# Patient Record
Sex: MALE | Race: WHITE | NOT HISPANIC OR LATINO | Employment: FULL TIME | URBAN - NONMETROPOLITAN AREA
[De-identification: names, ages, dates, MRNs, and addresses within clinical notes are randomized per-mention and may not be internally consistent; named-entity substitution may affect disease eponyms.]

---

## 2017-02-08 ENCOUNTER — HISTORY (OUTPATIENT)
Dept: EMERGENCY MEDICINE | Facility: OTHER | Age: 42
End: 2017-02-08

## 2017-09-12 ENCOUNTER — HISTORY (OUTPATIENT)
Dept: FAMILY MEDICINE | Facility: OTHER | Age: 42
End: 2017-09-12

## 2017-09-12 ENCOUNTER — OFFICE VISIT - GICH (OUTPATIENT)
Dept: FAMILY MEDICINE | Facility: OTHER | Age: 42
End: 2017-09-12

## 2017-09-12 DIAGNOSIS — T15.92XA: ICD-10-CM

## 2017-09-12 DIAGNOSIS — S05.92XA UNSPECIFIED INJURY OF LEFT EYE AND ORBIT, INITIAL ENCOUNTER: ICD-10-CM

## 2017-10-30 ENCOUNTER — HISTORY (OUTPATIENT)
Dept: FAMILY MEDICINE | Facility: OTHER | Age: 42
End: 2017-10-30

## 2017-10-30 ENCOUNTER — OFFICE VISIT - GICH (OUTPATIENT)
Dept: FAMILY MEDICINE | Facility: OTHER | Age: 42
End: 2017-10-30

## 2017-10-30 DIAGNOSIS — K21.9 GASTRO-ESOPHAGEAL REFLUX DISEASE WITHOUT ESOPHAGITIS: ICD-10-CM

## 2017-12-28 NOTE — PATIENT INSTRUCTIONS
Patient Information     Patient Name MRN Ankush Vora [id number] Male 1975      Patient Instructions by Priscilla Boothe NP at 2017  1:00 PM     Author:  Priscilla Boothe NP Service:  (none) Author Type:  PHYS- Nurse Practitioner     Filed:  2017  1:53 PM Encounter Date:  2017 Status:  Signed     :  Priscilla Boothe NP (PHYS- Nurse Practitioner)            We made an eye doctor appointment    Updated the TDAP (tetanus) today.     Follow what the eye doctor tells you

## 2017-12-28 NOTE — PROGRESS NOTES
Patient Information     Patient Name MRN Sex Ankush Marshall [id number] Male 1975      Progress Notes by Riley Atkins MD at 10/30/2017 10:45 AM     Author:  Riley Atkins MD Service:  (none) Author Type:  Physician     Filed:  10/30/2017 11:00 AM Encounter Date:  10/30/2017 Status:  Signed     :  Riley Atkins MD (Physician)            SUBJECTIVE:  Ankush Gamez is a 42 y.o. male who presents for a refill on omeprazole. He has a history of GERD and reports this controls symptoms well. He is in inpatient treatment and has been out of this for a week. Has noted a burning sensation in the epigastrium radiating up into his chest.    No Known Allergies and   No current outpatient prescriptions on file prior to visit.     No current facility-administered medications on file prior to visit.        OBJECTIVE:  /66  Pulse 72  Wt 74 kg (163 lb 2 oz)  BMI 23.41 kg/m2  EXAM:  General Appearance: Pleasant, alert, appropriate appearance for age. No acute distress  Gastrointestinal Exam: Mild tenderness noted in the epigastrium and right upper quadrant. No masses or organomegaly.    ASSESSMENT/Plan :      ICD-10-CM    1. Gastroesophageal reflux disease, esophagitis presence not specified  Omeprazole refilled. Form completed for [facility].  K21.9 omeprazole (PRILOSEC) 40 mg Delayed-Release capsule       Riley Atkins MD

## 2017-12-28 NOTE — PROGRESS NOTES
Patient Information     Patient Name MRN Ankush Vora [id number] Male 1975      Progress Notes by Priscilla Boothe NP at 2017  1:00 PM     Author:  Priscilla Boothe NP Service:  (none) Author Type:  PHYS- Nurse Practitioner     Filed:  2017  2:36 PM Encounter Date:  2017 Status:  Signed     :  Priscilla Boothe NP (PHYS- Nurse Practitioner)            Nursing Notes:   Yue Farr  2017  1:21 PM  Signed  Patient presents to the clinic for irritated left eye. Patient reports welding on his trailer a few days ago and felt something get past his safety glasses. He says it steadily got worse over time.  Yue B, CMA.......2017..1:11 PM    Visual Acuity Screening   Visual acuity OD (right eye): 10/12.5   Visual acuity OS (left eye): 10/50   Visual acuity OU (both eyes): 10/12.5   Corrective lenses worn: No   Yue B, CMA.......2017..1:20 PM       SUBJECTIVE:    Ankush Gamez is a 42 y.o. male who presents for eye injury    Eye Injury    The left eye is affected. This is a new problem. Episode onset: 2 days ago. The problem has been unchanged. The injury mechanism was a foreign body. The pain is mild. There is no known exposure to pink eye. He does not wear contacts. Associated symptoms include eye redness. Pertinent negatives include no blurred vision, fever, itching, recent URI or vomiting. He has tried nothing for the symptoms.       Current Outpatient Prescriptions on File Prior to Visit       Medication  Sig Dispense Refill     OMEPRAZOLE 40 MG CAP, DELAYED RELEASE take 1 capsule (40 mg) by oral route once daily before a meal 30 5     No current facility-administered medications on file prior to visit.     and   Patient Active Problem List      Diagnosis Date Noted     Esophageal reflux 2008     Adjustment disorder with mixed anxiety and depressed mood 2008     Other, mixed, or unspecified nondependent drug abuse, in remission 2008  "      REVIEW OF SYSTEMS:  Review of Systems   Constitutional: Negative for fever.   Eyes: Positive for redness. Negative for blurred vision.   Gastrointestinal: Negative for vomiting.   Skin: Negative for itching.       OBJECTIVE:  /78  Pulse 72  Temp 97.4  F (36.3  C) (Tympanic)  Ht 1.778 m (5' 10\")  Wt 69.2 kg (152 lb 8 oz)  BMI 21.88 kg/m2    EXAM:   Physical Exam   Constitutional: He is well-developed, well-nourished, and in no distress.   HENT:   Head: Normocephalic and atraumatic.   Eyes: EOM are normal. Pupils are equal, round, and reactive to light. Right conjunctiva is not injected. Right conjunctiva has no hemorrhage. Left conjunctiva is injected.   Slit lamp exam:       The left eye shows fluorescein uptake.       Black FB noted center of cornea. Mild abrasions noted on stain exam.    Neck: Neck supple.   Cardiovascular: Normal rate.    Pulmonary/Chest: Effort normal. No respiratory distress.   Lymphadenopathy:     He has no cervical adenopathy.   Skin: Skin is warm and dry. No rash noted.   Psychiatric: Mood and affect normal.   Nursing note and vitals reviewed.      ASSESSMENT/PLAN:    ICD-10-CM    1. Left eye injury, initial encounter S05.92XA TDAP VACCINE IM      AMB CONSULT TO OPTOMETRY/OPHTHALMOLOGY   2. Foreign body, eye, left, initial encounter T15.92XA TDAP VACCINE IM      AMB CONSULT TO OPTOMETRY/OPHTHALMOLOGY        Plan:  Attempted to remove FB with blunt end of a qtip and with a needle, without success. TDAP given. F/U with eye doctor, follow their instructions.       MARYBETH SCHWARTZ NP ....................  9/12/2017   1:58 PM          "

## 2017-12-30 NOTE — NURSING NOTE
Patient Information     Patient Name MRAnkush Hidalgo [id number] Male 1975      Nursing Note by Yue Farr at 2017  1:00 PM     Author:  Yue Farr Service:  (none) Author Type:  (none)     Filed:  2017  1:21 PM Encounter Date:  2017 Status:  Signed     :  Yue Farr            Patient presents to the clinic for irritated left eye. Patient reports welding on his trailer a few days ago and felt something get past his safety glasses. He says it steadily got worse over time.  Yue B, CMA.......2017..1:11 PM    Visual Acuity Screening   Visual acuity OD (right eye): 10/12.5   Visual acuity OS (left eye): 10/50   Visual acuity OU (both eyes): 10/12.5   Corrective lenses worn: No   Yue B, CMA.......2017..1:20 PM

## 2017-12-30 NOTE — NURSING NOTE
Patient Information     Patient Name MRN Ankush Vora [id number] Male 1975      Nursing Note by Yue Farr at 2017  1:00 PM     Author:  Yue Farr Service:  (none) Author Type:  (none)     Filed:  2017  2:18 PM Encounter Date:  2017 Status:  Signed     :  Yue Farr            Fluorescein sodium strip ordered by Priscilla Boothe NP.  Medication administered per order in [place]   Lot # [id number]  Exp.   Patient tolerated well.  Yue Farr...2017..2:17 PM    Tetracaine hydrochloride ordered by Priscilla Boothe NP.  Medication administered per order in [place]   Lot # [id number]  Exp. 2019  Patient tolerated well.  Yue Farr...2017..2:17 PM

## 2017-12-30 NOTE — NURSING NOTE
Patient Information     Patient Name MRN Ankush Vora [id number] Male 1975      Nursing Note by Mei Hood at 10/30/2017 10:45 AM     Author:  Mei Hood Service:  (none) Author Type:  (none)     Filed:  10/30/2017 10:54 AM Encounter Date:  10/30/2017 Status:  Signed     :  Mei Hood            Patient presents today with chronic acid reflux. He states that he is in treatment and they require a prescription to be able to administer the medication to him. He takes omeprazole OTC and this works for him.  Mei Hood LPN  10/30/2017  10:47 AM

## 2018-01-09 ENCOUNTER — OFFICE VISIT - GICH (OUTPATIENT)
Dept: FAMILY MEDICINE | Facility: OTHER | Age: 43
End: 2018-01-09

## 2018-01-09 ENCOUNTER — HISTORY (OUTPATIENT)
Dept: FAMILY MEDICINE | Facility: OTHER | Age: 43
End: 2018-01-09

## 2018-01-09 DIAGNOSIS — A64 SEXUALLY TRANSMITTED DISEASE: ICD-10-CM

## 2018-01-09 DIAGNOSIS — A54.01: ICD-10-CM

## 2018-01-09 DIAGNOSIS — R30.0 DYSURIA: ICD-10-CM

## 2018-01-16 RX ORDER — OMEPRAZOLE 40 MG/1
40 CAPSULE, DELAYED RELEASE ORAL
Status: ON HOLD | COMMUNITY
Start: 2017-10-30 | End: 2022-01-25

## 2018-01-16 RX ORDER — AZITHROMYCIN 500 MG/1
TABLET, FILM COATED ORAL
COMMUNITY
Start: 2018-01-09 | End: 2019-12-14

## 2018-01-25 VITALS
WEIGHT: 163.13 LBS | DIASTOLIC BLOOD PRESSURE: 66 MMHG | BODY MASS INDEX: 23.41 KG/M2 | SYSTOLIC BLOOD PRESSURE: 130 MMHG | HEART RATE: 72 BPM

## 2018-01-25 VITALS
TEMPERATURE: 97.4 F | BODY MASS INDEX: 21.83 KG/M2 | SYSTOLIC BLOOD PRESSURE: 122 MMHG | WEIGHT: 152.5 LBS | HEART RATE: 72 BPM | DIASTOLIC BLOOD PRESSURE: 78 MMHG | HEIGHT: 70 IN

## 2018-02-09 VITALS
SYSTOLIC BLOOD PRESSURE: 132 MMHG | BODY MASS INDEX: 24.2 KG/M2 | WEIGHT: 169 LBS | HEIGHT: 70 IN | HEART RATE: 80 BPM | DIASTOLIC BLOOD PRESSURE: 80 MMHG

## 2018-02-12 NOTE — PROGRESS NOTES
"Patient Information     Patient Name MRN Sex Ankush Marshall [id number] Male 1975      Progress Notes by Mila Antoine MD at 2018  1:30 PM     Author:  Mila Antoine MD Service:  (none) Author Type:  Physician     Filed:  2018  3:50 PM Encounter Date:  2018 Status:  Signed     :  Mila Antoine MD (Physician)            SUBJECTIVE:    Ankush Gamez is a 42 y.o. male who presents for concerns about burning with urination and penile discharge in the past week. No known contacts or previous + STD testing. Reports a large amount of penile discharge.    HPI    No Known Allergies,   Current Outpatient Prescriptions:      azithromycin (ZITHROMAX) 500 mg tablet, Take 2 tablets(1000mg) for one time dose., Disp: 2 tablet, Rfl: 0     omeprazole (PRILOSEC) 40 mg Delayed-Release capsule, Take 1 capsule by mouth once daily., Disp: 30 capsule, Rfl: 5  Medications have been reviewed by me and are current to the best of my knowledge and ability. and   Patient Active Problem List      Diagnosis Date Noted     Esophageal reflux 2008     Adjustment disorder with mixed anxiety and depressed mood 2008     Other, mixed, or unspecified nondependent drug abuse, in remission 2008       REVIEW OF SYSTEMS:  ROS    OBJECTIVE:  /80 (Cuff Site: Right Arm, Position: Sitting, Cuff Size: Adult Regular)  Pulse 80  Ht 1.78 m (5' 10.08\")  Wt 76.7 kg (169 lb)  BMI 24.19 kg/m2    EXAM:   Physical Exam   Constitutional: He is well-developed, well-nourished, and in no distress. No distress.   Nursing note and vitals reviewed.    Results for orders placed or performed in visit on 18      GC CHLAMYDIA TRACH PROBE      Result  Value Ref Range    CHLAMYDIA PCR NOT Detected NOT Detected, Invalid    N GONORRHOEAE PCR Detected (A) NOT Detected, Invalid     I have personally reviewed the labs listed above.      ASSESSMENT/PLAN:    ICD-10-CM    1. Gonococcal urethritis in male A54.01  "   2. Dysuria R30.0 GC CHLAMYDIA TRACH PROBE      GC CHLAMYDIA TRACH PROBE   3. STI (sexually transmitted infection) A64 cefTRIAXone 250 mg intramuscular injection with lidocaine (ROCEPHIN)      azithromycin (ZITHROMAX) 500 mg tablet        Plan:    He should has been adequately treated for gonococcal disease with Rocephin given today. He will be notified of results by phone and was not available at all with first call.  Mila Antoine MD  3:49 PM 1/9/2018   Portions of this dictation were created using the Dragon Nuance voice recognition system. Proofreading was completed but there may be errors in text.

## 2018-02-12 NOTE — NURSING NOTE
Patient Information     Patient Name N Ankush Vora [id number] Male 1975      Nursing Note by Isa Donald at 2018  1:30 PM     Author:  Isa Donald Service:  (none) Author Type:  (none)     Filed:  2018  1:57 PM Encounter Date:  2018 Status:  Signed     :  Isa Donald            Ankush Gamez is a 42 y.o. Male here with c/o burning with urination, and discharge from penis, wants to be checked for STD.  Shanae Donald LPN ...... 2018 1:29 PM

## 2018-02-12 NOTE — PATIENT INSTRUCTIONS
Patient Information     Patient Name Ankush Barkley [id number] Male 1975      Patient Instructions by Mila Antoine MD at 2018  3:47 PM     Author:  Mila Antoine MD  Service:  (none) Author Type:  Physician     Filed:  2018  3:48 PM  Encounter Date:  2018 Status:  Addendum     :  Mila Antoine MD (Physician)        Related Notes: Original Note by Mila Antoine MD (Physician) filed at 2018  3:47 PM               Index Arabic Related topics   Gonorrhea in Men   ________________________________________________________________________  KEY POINTS    Gonorrhea is a sexually transmitted disease or infection that usually affects the urethra and other organs. Infection with gonorrhea can cause infertility, which means you may have trouble getting your partner pregnant when you decide you want to have a family.    You will be treated with antibiotic medicine. Your infection will be reported to the local health department confidentially, so your sexual partners can be told that they have had contact with someone who has a sexually transmitted infection.    To prevent a gonorrhea infection and other STDs, always use latex or polyurethane condoms during foreplay and every time you have vaginal, oral, or anal sex.  ________________________________________________________________________  What is gonorrhea?   Gonorrhea is a common sexually transmitted disease or infection (also called an STD or STI). Other names for gonorrhea are clap, drip, dose, and strain.  The infection usually starts in the urethra. The urethra is the tube that carries urine and semen out of the penis. The bacteria may also infect the throat or rectum during oral or anal sex. Gonorrhea that is not treated may spread into the bloodstream and to other parts of the body.     It may infect the joints and cause pain and swelling (arthritis).    It may spread to the brain and cause meningitis.    It  may infect the heart.    Rarely, it might cause death.  Also, if you have gonorrhea and have unsafe sex with someone who has HIV, you are more likely to be infected with HIV.  What is the cause?  The infection is caused by bacteria called Neisseria gonorrhoeae. It is usually passed from person to person during oral, vaginal, or anal sex.  What are the symptoms?   Many people don t have symptoms. This means you could pass the infection to your sexual partner without knowing that you are infected.   If you do have symptoms, they usually start 2 to 10 days after you were exposed to the disease. Symptoms may include:    Thick, yellow discharge from the penis    Burning or pain when you urinate    Feeling like you need to urinate often  How is it diagnosed?  Your healthcare provider will ask about your symptoms and sexual and medical history and examine you. Tests may include:     A test of fluid from the opening of the urethra, mouth, or anus    A urine test  How is it treated?  Gonorrhea is treated with antibiotic medicine. You may need to take more than 1 antibiotic.   If only the urethra is infected, antibiotic treatment should clear up the infection in about 10 days. If it is not treated, gonorrhea can cause scarring of the urethra, trouble urinating normally, and infection of the testicles. Testicle infection can sometimes cause infertility, which means that you would not be able to get your partner pregnant when you decide that you want to have a family.  You will be asked about your sexual partners. Your infection will be reported to the local health department and your sexual partners will be told that they have had contact with someone who has a sexually transmitted infection. (Your name will not be given.) This will help them get prompt treatment for the infection. It can also help prevent spreading the infection to others.  How can I take care of myself?    Take your medicine for as long as your healthcare  provider prescribes, even if you feel better. If you stop taking an antibiotic too soon, you may not kill all of the bacteria and you may get sick again.    Take nonprescription pain medicine if you need it.    Tell everyone with whom you have had sex in the last 3 months about your infection. Or you can ask the clinic staff to tell them without using your name. Your sexual contacts need to be treated even if they don t have any symptoms.    Don t have sex until both you and your partner have finished all of the medicine and your provider says it's OK. Then always use condoms every time you have sex.    Ask your healthcare provider:    How and when you will get your test results    If you should be tested for other STDs    How long it will take to recover from this illness    If there are activities you should avoid and when you can return to normal activities    When it is safe to have sex again    How to take care of yourself at home    What symptoms or problems you should watch for and what to do if you have them    If you need to come back to be checked to make sure the treatment worked  Keep all appointments for provider visits or tests.  How can I help prevent gonorrhea?  Gonorrhea can be a serious health threat to you and the people you have sex with. Many antibiotic medicines no longer treat gonorrhea, so prevention is very important.    Use latex or polyurethane condoms during foreplay and every time you have vaginal, oral, or anal sex.    Have just 1 sexual partner who is not sexually active with anyone else.    If you had sex and are worried that you may be infected, see your healthcare provider even if you don t have symptoms.  Developed by [employer].  Adult Advisor 2017.2 published by [employer].  Last modified: 2016-12-20  Last reviewed: 2016-01-21  This content is reviewed periodically and is subject to change as new health information becomes available. The information is intended to inform and  educate and is not a replacement for medical evaluation, advice, diagnosis or treatment by a healthcare professional.  References   Adult Advisor 2017.2 Index    Copyright   2017 [employer], a division of [employer] All rights reserved.

## 2019-12-14 ENCOUNTER — HOSPITAL ENCOUNTER (EMERGENCY)
Facility: OTHER | Age: 44
Discharge: HOME | End: 2019-12-14
Attending: EMERGENCY MEDICINE | Admitting: EMERGENCY MEDICINE

## 2019-12-14 VITALS
BODY MASS INDEX: 22.4 KG/M2 | RESPIRATION RATE: 22 BRPM | SYSTOLIC BLOOD PRESSURE: 142 MMHG | HEIGHT: 71 IN | TEMPERATURE: 98.6 F | DIASTOLIC BLOOD PRESSURE: 75 MMHG | OXYGEN SATURATION: 99 % | WEIGHT: 160 LBS

## 2019-12-14 DIAGNOSIS — R20.2 PARESTHESIAS: ICD-10-CM

## 2019-12-14 LAB
ALBUMIN SERPL-MCNC: 4.1 G/DL (ref 3.5–5.7)
ALP SERPL-CCNC: 65 U/L (ref 34–104)
ALT SERPL W P-5'-P-CCNC: 18 U/L (ref 7–52)
ANION GAP SERPL CALCULATED.3IONS-SCNC: 5 MMOL/L (ref 3–14)
AST SERPL W P-5'-P-CCNC: 19 U/L (ref 13–39)
BASOPHILS # BLD AUTO: 0.1 10E9/L (ref 0–0.2)
BASOPHILS NFR BLD AUTO: 0.4 %
BILIRUB SERPL-MCNC: 0.5 MG/DL (ref 0.3–1)
BUN SERPL-MCNC: 19 MG/DL (ref 7–25)
CALCIUM SERPL-MCNC: 9.2 MG/DL (ref 8.6–10.3)
CHLORIDE SERPL-SCNC: 102 MMOL/L (ref 98–107)
CO2 SERPL-SCNC: 29 MMOL/L (ref 21–31)
CREAT SERPL-MCNC: 0.84 MG/DL (ref 0.7–1.3)
DIFFERENTIAL METHOD BLD: ABNORMAL
EOSINOPHIL # BLD AUTO: 0.1 10E9/L (ref 0–0.7)
EOSINOPHIL NFR BLD AUTO: 1 %
ERYTHROCYTE [DISTWIDTH] IN BLOOD BY AUTOMATED COUNT: 12.6 % (ref 10–15)
GFR SERPL CREATININE-BSD FRML MDRD: >90 ML/MIN/{1.73_M2}
GLUCOSE SERPL-MCNC: 119 MG/DL (ref 70–105)
HCT VFR BLD AUTO: 41.3 % (ref 40–53)
HGB BLD-MCNC: 13.9 G/DL (ref 13.3–17.7)
IMM GRANULOCYTES # BLD: 0 10E9/L (ref 0–0.4)
IMM GRANULOCYTES NFR BLD: 0.2 %
LYMPHOCYTES # BLD AUTO: 2.4 10E9/L (ref 0.8–5.3)
LYMPHOCYTES NFR BLD AUTO: 19.6 %
MCH RBC QN AUTO: 31.4 PG (ref 26.5–33)
MCHC RBC AUTO-ENTMCNC: 33.7 G/DL (ref 31.5–36.5)
MCV RBC AUTO: 93 FL (ref 78–100)
MONOCYTES # BLD AUTO: 0.9 10E9/L (ref 0–1.3)
MONOCYTES NFR BLD AUTO: 7.4 %
NEUTROPHILS # BLD AUTO: 8.7 10E9/L (ref 1.6–8.3)
NEUTROPHILS NFR BLD AUTO: 71.4 %
PLATELET # BLD AUTO: 314 10E9/L (ref 150–450)
POTASSIUM SERPL-SCNC: 4.3 MMOL/L (ref 3.5–5.1)
PROT SERPL-MCNC: 6.6 G/DL (ref 6.4–8.9)
RBC # BLD AUTO: 4.43 10E12/L (ref 4.4–5.9)
SODIUM SERPL-SCNC: 136 MMOL/L (ref 134–144)
TSH SERPL DL<=0.05 MIU/L-ACNC: 5.93 IU/ML (ref 0.34–5.6)
WBC # BLD AUTO: 12.2 10E9/L (ref 4–11)

## 2019-12-14 PROCEDURE — 99283 EMERGENCY DEPT VISIT LOW MDM: CPT | Performed by: EMERGENCY MEDICINE

## 2019-12-14 PROCEDURE — 36415 COLL VENOUS BLD VENIPUNCTURE: CPT | Performed by: EMERGENCY MEDICINE

## 2019-12-14 PROCEDURE — 84443 ASSAY THYROID STIM HORMONE: CPT | Performed by: EMERGENCY MEDICINE

## 2019-12-14 PROCEDURE — 99283 EMERGENCY DEPT VISIT LOW MDM: CPT | Mod: Z6 | Performed by: EMERGENCY MEDICINE

## 2019-12-14 PROCEDURE — 80053 COMPREHEN METABOLIC PANEL: CPT | Performed by: EMERGENCY MEDICINE

## 2019-12-14 PROCEDURE — 85025 COMPLETE CBC W/AUTO DIFF WBC: CPT | Performed by: EMERGENCY MEDICINE

## 2019-12-14 ASSESSMENT — MIFFLIN-ST. JEOR: SCORE: 1642.89

## 2019-12-14 NOTE — ED AVS SNAPSHOT
[facility]  [address]  [place] [address]  Phone:  [phone]  Fax:  [phone]                                    Ankush Gamez   MRN: [id number]    Department:  [facility]   Date of Visit:  12/14/2019           After Visit Summary Signature Page    I have received my discharge instructions, and my questions have been answered. I have discussed any challenges I see with this plan with the nurse or doctor.    ..........................................................................................................................................  Patient/Patient Representative Signature      ..........................................................................................................................................  Patient Representative Print Name and Relationship to Patient    ..................................................               ................................................  Date                                   Time    ..........................................................................................................................................  Reviewed by Signature/Title    ...................................................              ..............................................  Date                                               Time          22EPIC Rev 08/18

## 2019-12-14 NOTE — ED TRIAGE NOTES
"Pt presents to the ER with numbness in both of his hands.  Started two days ago, he states he has had this before but was able to \"shake it off\" but this time he can't.   "

## 2019-12-14 NOTE — ED PROVIDER NOTES
"[facility]  Emergency Department Visit Note    Numbness      History of Present Illness     HPI:  Ankush Gamez is a 44 year old male presenting with numbness and tingling in his fingers, specifically the index, middle, and ring fingers of both hands. These symptoms are a chronic, intermittent problem but constant for the past 2 weeks prior to arrival. The patient has had similar symptoms in the past and has felt that he could always \"shake off\" the numbness. At time of emergency department evaluation, he has no  weakness, ataxia, headache, difficulty with word finding or formation, nausea, vomiting. Throughout this recent event, there have been no fever, chills, chest pain, abdominal pain, shortness of breath. There is no recent history of seizure activity or confusion.    Medications:  Prior to Admission medications    Medication Sig Last Dose Taking? Auth Provider   omeprazole (PRILOSEC) 40 MG capsule Take 40 mg by mouth 12/14/2019 at 0800 Yes Reported, Patient       Allergies:  No Known Allergies    Problem List:  Patient Active Problem List   Diagnosis     Adjustment disorder with mixed anxiety and depressed mood     Esophageal reflux     Other, mixed, or unspecified nondependent drug abuse, in remission       Past Medical History:  History reviewed. No pertinent past medical history.    Past Surgical History:  History reviewed. No pertinent surgical history.    Social History:  Social History     Tobacco Use     Smoking status: Current Every Day Smoker     Packs/day: 0.50     Types: Cigarettes     Smokeless tobacco: Never Used   Substance Use Topics     Alcohol use: No     Alcohol/week: 0.0 standard drinks     Drug use: Yes     Types: Methamphetamines, Other     Comment: Last 20 years       Review of Systems:  10 point review of systems obtained and pertinent positive and negative findings noted in HPI. Review of systems otherwise negative.      Physical Exam     Vital signs: BP (!) " "142/75   Temp 98.6  F (37  C) (Tympanic)   Resp 22   Ht 1.803 m (5' 11\")   Wt 72.6 kg (160 lb)   SpO2 99%   BMI 22.32 kg/m      Physical Exam:    General: awake and alert, comfortable  HEENT: atraumatic, no scleral injection, no nasal discharge, neck supple  Extremities: no deformities, edema, or tenderness  Skin: warm, dry, no rashes  Neuro: alert and oriented x 3, 5/5 bilateral hand , bicep, tricep, shoulder abduction, hip flexion, knee extension, plantar/dorsiflexion, sensation intact and symmetric to light touch in bilateral hands and feet, ambulates without difficulty or ataxia, finger to nose normal bilaterally, CN II-XII intact      Medical Decision Making & ED Course     Differential includes carpal tunnel syndrome, ulnar neuritis, cervical nerve root impingement, metabolic derangement, hypoglycemia, atypical migraine, psychosomatic. Neurologic exam is normal at time of emergency department evaluation and history is most consistent with carpal tunnel syndrome especially in light of his job as a  doing repetitive motions. CBC, CMP, TSH checked and follow up arranged with PCP.   .    Diagnosis & Disposition     Diagnosis:  1. Paresthesias        Disposition:  Home    MD Audrey Escobedo Theresa M, MD  12/14/19 1615    "

## 2019-12-20 ENCOUNTER — OFFICE VISIT (OUTPATIENT)
Dept: FAMILY MEDICINE | Facility: OTHER | Age: 44
End: 2019-12-20
Attending: FAMILY MEDICINE

## 2019-12-20 VITALS
HEIGHT: 71 IN | DIASTOLIC BLOOD PRESSURE: 80 MMHG | SYSTOLIC BLOOD PRESSURE: 134 MMHG | HEART RATE: 80 BPM | BODY MASS INDEX: 22 KG/M2 | RESPIRATION RATE: 17 BRPM | TEMPERATURE: 97.1 F | OXYGEN SATURATION: 98 % | WEIGHT: 157.13 LBS

## 2019-12-20 DIAGNOSIS — G56.03 BILATERAL CARPAL TUNNEL SYNDROME: Primary | ICD-10-CM

## 2019-12-20 PROCEDURE — 99213 OFFICE O/P EST LOW 20 MIN: CPT | Performed by: FAMILY MEDICINE

## 2019-12-20 ASSESSMENT — MIFFLIN-ST. JEOR: SCORE: 1629.84

## 2019-12-20 ASSESSMENT — PAIN SCALES - GENERAL: PAINLEVEL: NO PAIN (0)

## 2019-12-20 NOTE — NURSING NOTE
Patient presents to clinic today for numbness in right hand fingers. He states it used to go away, but is constant now.     No LMP for male patient.  Medication Reconciliation: complete    Carmencita Chen LPN  12/20/2019 11:17 AM

## 2019-12-23 NOTE — PROGRESS NOTES
"  SUBJECTIVE:   Ankush Gamez is a 44 year old male who presents to clinic today for the following health issues: ER follow-up patient was seen in the ER for paresthesia in the same fingers.  Is been going on for some time    .  Was seen in the ER 1 week ago.  Involving the middle fingers.  Right is worse than the left.        Patient Active Problem List    Diagnosis Date Noted     Bilateral carpal tunnel syndrome 12/20/2019     Priority: Medium     Adjustment disorder with mixed anxiety and depressed mood 02/24/2008     Priority: Medium     Esophageal reflux 02/24/2008     Priority: Medium     Other, mixed, or unspecified nondependent drug abuse, in remission 02/24/2008     Priority: Medium     History reviewed. No pertinent past medical history.   History reviewed. No pertinent surgical history.  No Known Allergies    Review of Systems     OBJECTIVE:     /80 (BP Location: Right arm, Patient Position: Sitting, Cuff Size: Adult Regular)   Pulse 80   Temp 97.1  F (36.2  C) (Tympanic)   Resp 17   Ht 1.803 m (5' 11\")   Wt 71.3 kg (157 lb 2 oz)   SpO2 98%   BMI 21.91 kg/m    Body mass index is 21.91 kg/m .  Physical Exam  Skin:     General: Skin is warm.   Neurological:      Mental Status: He is alert.      Deep Tendon Reflexes: Reflexes normal.      Comments: Positive Finkelstein sign on the right.  Hand grasp is decreased bilateral         Diagnostic Test Results:  none     ASSESSMENT/PLAN:         1. Bilateral carpal tunnel syndrome  Likely carpal tunnel.  Proceed with EMG.  - EMG; Future      Ho Tijerina MD  [facility]  "

## 2021-12-31 ENCOUNTER — HOSPITAL ENCOUNTER (EMERGENCY)
Facility: OTHER | Age: 46
Discharge: HOME | End: 2021-12-31
Attending: FAMILY MEDICINE | Admitting: FAMILY MEDICINE

## 2021-12-31 ENCOUNTER — APPOINTMENT (OUTPATIENT)
Dept: CT IMAGING | Facility: OTHER | Age: 46
End: 2021-12-31
Attending: FAMILY MEDICINE

## 2021-12-31 VITALS
RESPIRATION RATE: 21 BRPM | HEART RATE: 71 BPM | TEMPERATURE: 96.8 F | BODY MASS INDEX: 22.45 KG/M2 | OXYGEN SATURATION: 96 % | WEIGHT: 160.94 LBS | SYSTOLIC BLOOD PRESSURE: 118 MMHG | DIASTOLIC BLOOD PRESSURE: 59 MMHG

## 2021-12-31 DIAGNOSIS — R20.2 PARESTHESIAS: ICD-10-CM

## 2021-12-31 LAB
AMPHETAMINES UR QL: NOT DETECTED
ANION GAP SERPL CALCULATED.3IONS-SCNC: 6 MMOL/L (ref 3–14)
ATRIAL RATE - MUSE: 69 BPM
BARBITURATES UR QL SCN: NOT DETECTED
BASOPHILS # BLD AUTO: 0.1 10E3/UL (ref 0–0.2)
BASOPHILS NFR BLD AUTO: 0 %
BENZODIAZ UR QL SCN: NOT DETECTED
BUN SERPL-MCNC: 25 MG/DL (ref 7–25)
BUPRENORPHINE UR QL: NOT DETECTED
CALCIUM SERPL-MCNC: 9.2 MG/DL (ref 8.6–10.3)
CANNABINOIDS UR QL: ABNORMAL
CHLORIDE BLD-SCNC: 106 MMOL/L (ref 98–107)
CO2 SERPL-SCNC: 27 MMOL/L (ref 21–31)
COCAINE UR QL SCN: NOT DETECTED
CREAT SERPL-MCNC: 0.92 MG/DL (ref 0.7–1.3)
D-METHAMPHET UR QL: NOT DETECTED
DIASTOLIC BLOOD PRESSURE - MUSE: NORMAL MMHG
EOSINOPHIL # BLD AUTO: 0.2 10E3/UL (ref 0–0.7)
EOSINOPHIL NFR BLD AUTO: 2 %
ERYTHROCYTE [DISTWIDTH] IN BLOOD BY AUTOMATED COUNT: 13.2 % (ref 10–15)
GFR SERPL CREATININE-BSD FRML MDRD: >90 ML/MIN/1.73M2
GLUCOSE BLD-MCNC: 107 MG/DL (ref 70–105)
HCT VFR BLD AUTO: 38.2 % (ref 40–53)
HGB BLD-MCNC: 13.2 G/DL (ref 13.3–17.7)
IMM GRANULOCYTES # BLD: 0.1 10E3/UL
IMM GRANULOCYTES NFR BLD: 0 %
INTERPRETATION ECG - MUSE: NORMAL
LYMPHOCYTES # BLD AUTO: 2.1 10E3/UL (ref 0.8–5.3)
LYMPHOCYTES NFR BLD AUTO: 18 %
MAGNESIUM SERPL-MCNC: 2 MG/DL (ref 1.9–2.7)
MCH RBC QN AUTO: 32 PG (ref 26.5–33)
MCHC RBC AUTO-ENTMCNC: 34.6 G/DL (ref 31.5–36.5)
MCV RBC AUTO: 93 FL (ref 78–100)
METHADONE UR QL SCN: NOT DETECTED
MONOCYTES # BLD AUTO: 1.1 10E3/UL (ref 0–1.3)
MONOCYTES NFR BLD AUTO: 9 %
NEUTROPHILS # BLD AUTO: 8.1 10E3/UL (ref 1.6–8.3)
NEUTROPHILS NFR BLD AUTO: 71 %
NRBC # BLD AUTO: 0 10E3/UL
NRBC BLD AUTO-RTO: 0 /100
OPIATES UR QL SCN: NOT DETECTED
OXYCODONE UR QL SCN: NOT DETECTED
P AXIS - MUSE: 78 DEGREES
PCP UR QL SCN: NOT DETECTED
PLATELET # BLD AUTO: 321 10E3/UL (ref 150–450)
POTASSIUM BLD-SCNC: 3.8 MMOL/L (ref 3.5–5.1)
PR INTERVAL - MUSE: 120 MS
PROPOXYPH UR QL: NOT DETECTED
QRS DURATION - MUSE: 84 MS
QT - MUSE: 394 MS
QTC - MUSE: 422 MS
R AXIS - MUSE: 79 DEGREES
RBC # BLD AUTO: 4.13 10E6/UL (ref 4.4–5.9)
SODIUM SERPL-SCNC: 139 MMOL/L (ref 134–144)
SYSTOLIC BLOOD PRESSURE - MUSE: NORMAL MMHG
T AXIS - MUSE: 52 DEGREES
TRICYCLICS UR QL SCN: NOT DETECTED
TROPONIN I SERPL-MCNC: <2.4 PG/ML (ref 0–34)
VENTRICULAR RATE- MUSE: 69 BPM
WBC # BLD AUTO: 11.5 10E3/UL (ref 4–11)

## 2021-12-31 PROCEDURE — 80306 DRUG TEST PRSMV INSTRMNT: CPT | Performed by: FAMILY MEDICINE

## 2021-12-31 PROCEDURE — 85025 COMPLETE CBC W/AUTO DIFF WBC: CPT | Performed by: FAMILY MEDICINE

## 2021-12-31 PROCEDURE — 84484 ASSAY OF TROPONIN QUANT: CPT | Performed by: FAMILY MEDICINE

## 2021-12-31 PROCEDURE — 93005 ELECTROCARDIOGRAM TRACING: CPT | Performed by: FAMILY MEDICINE

## 2021-12-31 PROCEDURE — 80048 BASIC METABOLIC PNL TOTAL CA: CPT | Performed by: FAMILY MEDICINE

## 2021-12-31 PROCEDURE — 70450 CT HEAD/BRAIN W/O DYE: CPT | Mod: TC

## 2021-12-31 PROCEDURE — 36415 COLL VENOUS BLD VENIPUNCTURE: CPT | Performed by: FAMILY MEDICINE

## 2021-12-31 PROCEDURE — 93010 ELECTROCARDIOGRAM REPORT: CPT | Performed by: INTERNAL MEDICINE

## 2021-12-31 PROCEDURE — 99284 EMERGENCY DEPT VISIT MOD MDM: CPT | Performed by: FAMILY MEDICINE

## 2021-12-31 PROCEDURE — 83735 ASSAY OF MAGNESIUM: CPT | Performed by: FAMILY MEDICINE

## 2021-12-31 PROCEDURE — 99285 EMERGENCY DEPT VISIT HI MDM: CPT | Mod: 25 | Performed by: FAMILY MEDICINE

## 2021-12-31 ASSESSMENT — ENCOUNTER SYMPTOMS
DIFFICULTY URINATING: 0
NUMBNESS: 1
PALPITATIONS: 0
CHILLS: 0
SHORTNESS OF BREATH: 0
FEVER: 0
DIARRHEA: 0
FATIGUE: 0
DYSURIA: 0
COUGH: 0
HEADACHES: 0
LIGHT-HEADEDNESS: 0
SPEECH DIFFICULTY: 0
NAUSEA: 0
DIZZINESS: 0
WEAKNESS: 0
SORE THROAT: 0
ABDOMINAL PAIN: 0
FACIAL ASYMMETRY: 0
BACK PAIN: 0
VOMITING: 0
NECK PAIN: 0

## 2021-12-31 NOTE — ED TRIAGE NOTES
ED Nursing Triage Note (General)   ________________________________    Ankush Gamez is a 46 year old Male that presents to triage private car with history of sudden onset of numbness and tingling over entire body except for hands and feet along with a metallic taste in mouth reported by patient   Significant symptoms had onset 1.5 hour(s) ago. 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   /73   Pulse 80   Temp 96.8  F (36  C) (Tympanic)   Resp 16   Wt 73 kg (160 lb 15 oz)   SpO2 99%   BMI 22.45 kg/m  t  Patient appears alert , in no acute distress., and cooperative behavior.  GCS Eye Opening = 4=Spontaneous  Airway: intact  Breathing noted as Normal  Circulation Normal  Skin:  Normal  Action taken:  [place] [facility]      PRE HOSPITAL PRIOR LIVING SITUATION Significant Other

## 2021-12-31 NOTE — ED PROVIDER NOTES
History     Chief Complaint   Patient presents with     Numbness     HPI  Ankush Gamez is a 46 year old male who presents to the emergency room complaining of generalized numbness and tingling.  Patient states that he woke up it 2 AM this morning, approximately 2 hours ago, and has numbness and tingling all over his entire body.  He denies any headache, facial droop, difficulty speaking or finding words, weakness, fever, sore throat, cough, chest pain, shortness of breath.  He has no further questions or complaints today.  He denies any previous episodes.    Allergies:  No Known Allergies    Problem List:    Patient Active Problem List    Diagnosis Date Noted     Bilateral carpal tunnel syndrome 12/20/2019     Priority: Medium     Adjustment disorder with mixed anxiety and depressed mood 02/24/2008     Priority: Medium     Esophageal reflux 02/24/2008     Priority: Medium     Other, mixed, or unspecified nondependent drug abuse, in remission 02/24/2008     Priority: Medium        Past Medical History:    No past medical history on file.    Past Surgical History:    No past surgical history on file.    Family History:    No family history on file.    Social History:  Marital Status:  Single [1]  Social History     Tobacco Use     Smoking status: Current Every Day Smoker     Packs/day: 0.50     Types: Cigarettes     Smokeless tobacco: Never Used   Substance Use Topics     Alcohol use: No     Alcohol/week: 0.0 standard drinks     Drug use: Yes     Types: Methamphetamines, Other     Comment: Last 20 years        Medications:    omeprazole (PRILOSEC) 40 MG capsule          Review of Systems   Constitutional: Negative for chills, fatigue and fever.   HENT: Negative for congestion and sore throat.    Eyes: Negative for visual disturbance.   Respiratory: Negative for cough and shortness of breath.    Cardiovascular: Negative for chest pain, palpitations and leg swelling.   Gastrointestinal: Negative for abdominal pain,  diarrhea, nausea and vomiting.   Genitourinary: Negative for difficulty urinating and dysuria.   Musculoskeletal: Negative for back pain and neck pain.   Skin: Negative for rash.   Neurological: Positive for numbness. Negative for dizziness, facial asymmetry, speech difficulty, weakness, light-headedness and headaches.   All other systems reviewed and are negative.      Physical Exam   BP: 132/73  Pulse: 80  Temp: 96.8  F (36  C)  Resp: 16  Weight: 73 kg (160 lb 15 oz)  SpO2: 99 %      Physical Exam  Vitals and nursing note reviewed.   Constitutional:       General: He is not in acute distress.     Appearance: Normal appearance. He is well-developed, well-groomed and normal weight. He is not ill-appearing or diaphoretic.   HENT:      Head: Normocephalic and atraumatic.      Right Ear: External ear normal.      Left Ear: External ear normal.      Nose: Nose normal.      Mouth/Throat:      Lips: Pink.      Mouth: Mucous membranes are moist.      Pharynx: Oropharynx is clear. Uvula midline.   Eyes:      Extraocular Movements: Extraocular movements intact.      Conjunctiva/sclera: Conjunctivae normal.      Pupils: Pupils are equal, round, and reactive to light.   Neck:      Trachea: Trachea and phonation normal.   Cardiovascular:      Rate and Rhythm: Normal rate and regular rhythm.      Pulses: Normal pulses.      Heart sounds: Normal heart sounds. No murmur heard.      Pulmonary:      Effort: Pulmonary effort is normal.      Breath sounds: Normal breath sounds. No decreased breath sounds, wheezing, rhonchi or rales.   Abdominal:      General: Bowel sounds are normal.      Palpations: Abdomen is soft.      Tenderness: There is no abdominal tenderness.   Musculoskeletal:         General: No tenderness. Normal range of motion.      Cervical back: Full passive range of motion without pain, normal range of motion and neck supple.      Right lower leg: No edema.      Left lower leg: No edema.   Lymphadenopathy:       Cervical: No cervical adenopathy.   Skin:     General: Skin is warm and dry.      Capillary Refill: Capillary refill takes less than 2 seconds.      Findings: No rash.   Neurological:      Mental Status: He is alert and oriented to person, place, and time.      GCS: GCS eye subscore is 4. GCS verbal subscore is 5. GCS motor subscore is 6.      Cranial Nerves: Cranial nerves are intact.      Sensory: Sensation is intact.      Motor: Motor function is intact.   Psychiatric:         Behavior: Behavior is cooperative.         ED Course     Procedures           EKG Interpretation:      Interpreted by Jerzy Irizarry MD, MD  Time reviewed: 03:51  Symptoms at time of EKG: numbness   Rhythm: normal sinus   Rate: normal  Axis: normal  Ectopy: none  Conduction: normal  ST Segments/ T Waves: No ST-T wave changes  Q Waves: none  Comparison to prior: Unchanged    Clinical Impression: normal EKG    Critical Care time:  none    Results for orders placed or performed during the hospital encounter of 12/31/21 (from the past 24 hour(s))   Urine Drugs of Abuse Screen    Narrative    The following orders were created for panel order Urine Drugs of Abuse Screen.  Procedure                               Abnormality         Status                     ---------                               -----------         ------                     Urine Drugs of Abuse Scr...[[id number]]  Abnormal            Final result                 Please view results for these tests on the individual orders.   Urine Drugs of Abuse Screen Panel 13   Result Value Ref Range    Cannabinoids (81-ixd-3-carboxy-9-THC) Presumptive positive-Unconfirmed result (A) Not Detected    Phencyclidine Not Detected Not Detected    Cocaine (Benzoylecgonine) Not Detected Not Detected    Methamphetamine (d-Methamphetamine) Not Detected Not Detected    Opiates (Morphine) Not Detected Not Detected    Amphetamine (d-Amphetamine) Not Detected Not Detected    Benzodiazepines (Nordiazepam)  Not Detected Not Detected    Tricyclic Antidepressants (Desipramine) Not Detected Not Detected    Methadone Not Detected Not Detected    Barbiturates (Butalbital) Not Detected Not Detected    Oxycodone Not Detected Not Detected    Propoxyphene (Norpropoxyphene) Not Detected Not Detected    Buprenorphine Not Detected Not Detected   CT Head w/o Contrast    Narrative    PROCEDURE INFORMATION:   Exam: CT Head Without Contrast   Exam date and time: 12/31/2021 3:48 AM   Age: 46 years old   Clinical indication: Numbness / parasthesia and other: Metallic taste;   Additional info: History of sudden onset of numbness and tingling over entire   body except for hands and feet along with a metallic taste in mouth reported by   patient     TECHNIQUE:   Imaging protocol: Computed tomography of the head without contrast.   Radiation optimization: All CT scans at this facility use at least one of these   dose optimization techniques: automated exposure control; mA and/or kV   adjustment per patient size (includes targeted exams where dose is matched to   clinical indication); or iterative reconstruction.     COMPARISON:   No relevant prior studies available.     FINDINGS:   Brain: No acute findings. No intra or extra axial acute hemorrhage or mass. No   edema.   Cerebral ventricles: No acute findings.  No hydrocephalus Paranasal sinuses:   Visualized sinuses are unremarkable. No fluid levels.   Mastoid air cells: No acute findings. No mastoid effusion or osseous   destruction.   Bones/joints: No acute findings. No acute fracture.   Soft tissues: No radio-opaque foreign body.       Impression    IMPRESSION:   No acute intracranial abnormalities are identified.  No intra-or extra-axial   hemorrhage.  No CT evidence of acute territorial infarction.    Other findings as described above.    Changes of an acute infarct may not be visible on CT for up to 24 to 48 hours.    If this is of clinical concern, a follow up examination and/or MRI  may be of   benefit.      THIS DOCUMENT HAS BEEN ELECTRONICALLY SIGNED BY VIRGINIA PEREZ MD   CBC with platelets differential    Narrative    The following orders were created for panel order CBC with platelets differential.  Procedure                               Abnormality         Status                     ---------                               -----------         ------                     CBC with platelets and d...[[id number]]  Abnormal            Final result                 Please view results for these tests on the individual orders.   Basic metabolic panel   Result Value Ref Range    Sodium 139 134 - 144 mmol/L    Potassium 3.8 3.5 - 5.1 mmol/L    Chloride 106 98 - 107 mmol/L    Carbon Dioxide (CO2) 27 21 - 31 mmol/L    Anion Gap 6 3 - 14 mmol/L    Urea Nitrogen 25 7 - 25 mg/dL    Creatinine 0.92 0.70 - 1.30 mg/dL    Calcium 9.2 8.6 - 10.3 mg/dL    Glucose 107 (H) 70 - 105 mg/dL    GFR Estimate >90 >60 mL/min/1.73m2   Magnesium   Result Value Ref Range    Magnesium 2.0 1.9 - 2.7 mg/dL   Troponin I   Result Value Ref Range    Troponin I <2.4 0.0 - 34.0 pg/mL   CBC with platelets and differential   Result Value Ref Range    WBC Count 11.5 (H) 4.0 - 11.0 10e3/uL    RBC Count 4.13 (L) 4.40 - 5.90 10e6/uL    Hemoglobin 13.2 (L) 13.3 - 17.7 g/dL    Hematocrit 38.2 (L) 40.0 - 53.0 %    MCV 93 78 - 100 fL    MCH 32.0 26.5 - 33.0 pg    MCHC 34.6 31.5 - 36.5 g/dL    RDW 13.2 10.0 - 15.0 %    Platelet Count 321 150 - 450 10e3/uL    % Neutrophils 71 %    % Lymphocytes 18 %    % Monocytes 9 %    % Eosinophils 2 %    % Basophils 0 %    % Immature Granulocytes 0 %    NRBCs per 100 WBC 0 <1 /100    Absolute Neutrophils 8.1 1.6 - 8.3 10e3/uL    Absolute Lymphocytes 2.1 0.8 - 5.3 10e3/uL    Absolute Monocytes 1.1 0.0 - 1.3 10e3/uL    Absolute Eosinophils 0.2 0.0 - 0.7 10e3/uL    Absolute Basophils 0.1 0.0 - 0.2 10e3/uL    Absolute Immature Granulocytes 0.1 <=0.4 10e3/uL    Absolute NRBCs 0.0 10e3/uL        Medications - No data to display    Assessments & Plan (with Medical Decision Making)     I have reviewed the nursing notes.    EKG was obtained. There is no evidence of acute ischemia. Troponin was negative.    CT scan of the head was obtained and is negative for acute findings.    Lab tests and X-rays were reviewed as above. Results were consistent with generalized paresthesia.  There are no acute findings to explain the patient's symptoms but emergent causes have been ruled out.    I had a discussion with the patient and the family regarding the symptoms, exam, laboratory, CT Scan, EKG results, diagnosis, and plan.    I answered all questions to the best of my ability.    The patient voiced understanding of the plan, was agreeable, and had no further questions or concerns. Advised to return for any worsening symptoms.  The patient is discharged home in good condition.  Follow-up with primary care physician for referral to neurology if symptoms persist.  I have reviewed the findings, diagnosis, plan and need for follow up with the patient.    New Prescriptions    No medications on file       Final diagnoses:   Paresthesias       12/31/2021   [facility]     Jerzy Irizarry MD  12/31/21 [id number]

## 2022-01-24 ENCOUNTER — HOSPITAL ENCOUNTER (OUTPATIENT)
Facility: OTHER | Age: 47
Setting detail: OBSERVATION
Discharge: HOME | End: 2022-01-25
Attending: STUDENT IN AN ORGANIZED HEALTH CARE EDUCATION/TRAINING PROGRAM | Admitting: FAMILY MEDICINE

## 2022-01-24 DIAGNOSIS — K21.9 GASTROESOPHAGEAL REFLUX DISEASE WITHOUT ESOPHAGITIS: ICD-10-CM

## 2022-01-24 DIAGNOSIS — Z11.52 ENCOUNTER FOR SCREENING LABORATORY TESTING FOR SEVERE ACUTE RESPIRATORY SYNDROME CORONAVIRUS 2 (SARS-COV-2): ICD-10-CM

## 2022-01-24 DIAGNOSIS — F17.210 CIGARETTE SMOKER: ICD-10-CM

## 2022-01-24 DIAGNOSIS — A04.72 C. DIFFICILE COLITIS: Primary | ICD-10-CM

## 2022-01-24 DIAGNOSIS — K35.80 ACUTE APPENDICITIS, UNSPECIFIED ACUTE APPENDICITIS TYPE: ICD-10-CM

## 2022-01-24 DIAGNOSIS — Z79.899 ENCOUNTER FOR LONG-TERM (CURRENT) USE OF MEDICATIONS: ICD-10-CM

## 2022-01-24 LAB
ALBUMIN SERPL-MCNC: 4.4 G/DL (ref 3.5–5.7)
ALBUMIN UR-MCNC: NEGATIVE MG/DL
ALP SERPL-CCNC: 68 U/L (ref 34–104)
ALT SERPL W P-5'-P-CCNC: 17 U/L (ref 7–52)
ANION GAP SERPL CALCULATED.3IONS-SCNC: 10 MMOL/L (ref 3–14)
APPEARANCE UR: CLEAR
AST SERPL W P-5'-P-CCNC: 20 U/L (ref 13–39)
BASOPHILS # BLD AUTO: 0.1 10E3/UL (ref 0–0.2)
BASOPHILS NFR BLD AUTO: 0 %
BILIRUB SERPL-MCNC: 0.7 MG/DL (ref 0.3–1)
BILIRUB UR QL STRIP: NEGATIVE
BUN SERPL-MCNC: 21 MG/DL (ref 7–25)
CALCIUM SERPL-MCNC: 10 MG/DL (ref 8.6–10.3)
CHLORIDE BLD-SCNC: 99 MMOL/L (ref 98–107)
CO2 SERPL-SCNC: 28 MMOL/L (ref 21–31)
COLOR UR AUTO: NORMAL
CREAT SERPL-MCNC: 0.91 MG/DL (ref 0.7–1.3)
EOSINOPHIL # BLD AUTO: 0.1 10E3/UL (ref 0–0.7)
EOSINOPHIL NFR BLD AUTO: 1 %
ERYTHROCYTE [DISTWIDTH] IN BLOOD BY AUTOMATED COUNT: 13.1 % (ref 10–15)
GFR SERPL CREATININE-BSD FRML MDRD: >90 ML/MIN/1.73M2
GLUCOSE BLD-MCNC: 106 MG/DL (ref 70–105)
GLUCOSE UR STRIP-MCNC: NEGATIVE MG/DL
HCT VFR BLD AUTO: 42.2 % (ref 40–53)
HGB BLD-MCNC: 14.6 G/DL (ref 13.3–17.7)
HGB UR QL STRIP: NEGATIVE
IMM GRANULOCYTES # BLD: 0.1 10E3/UL
IMM GRANULOCYTES NFR BLD: 1 %
KETONES UR STRIP-MCNC: NEGATIVE MG/DL
LEUKOCYTE ESTERASE UR QL STRIP: NEGATIVE
LIPASE SERPL-CCNC: 19 U/L (ref 11–82)
LYMPHOCYTES # BLD AUTO: 2.2 10E3/UL (ref 0.8–5.3)
LYMPHOCYTES NFR BLD AUTO: 11 %
MCH RBC QN AUTO: 31.4 PG (ref 26.5–33)
MCHC RBC AUTO-ENTMCNC: 34.6 G/DL (ref 31.5–36.5)
MCV RBC AUTO: 91 FL (ref 78–100)
MONOCYTES # BLD AUTO: 1.6 10E3/UL (ref 0–1.3)
MONOCYTES NFR BLD AUTO: 8 %
NEUTROPHILS # BLD AUTO: 15.7 10E3/UL (ref 1.6–8.3)
NEUTROPHILS NFR BLD AUTO: 79 %
NITRATE UR QL: NEGATIVE
NRBC # BLD AUTO: 0 10E3/UL
NRBC BLD AUTO-RTO: 0 /100
PH UR STRIP: 6 [PH] (ref 5–9)
PLATELET # BLD AUTO: 342 10E3/UL (ref 150–450)
POTASSIUM BLD-SCNC: 4 MMOL/L (ref 3.5–5.1)
PROT SERPL-MCNC: 7.4 G/DL (ref 6.4–8.9)
RBC # BLD AUTO: 4.65 10E6/UL (ref 4.4–5.9)
SODIUM SERPL-SCNC: 137 MMOL/L (ref 134–144)
SP GR UR STRIP: 1.02 (ref 1–1.03)
UROBILINOGEN UR STRIP-MCNC: NORMAL MG/DL
WBC # BLD AUTO: 19.7 10E3/UL (ref 4–11)

## 2022-01-24 PROCEDURE — 83690 ASSAY OF LIPASE: CPT | Performed by: STUDENT IN AN ORGANIZED HEALTH CARE EDUCATION/TRAINING PROGRAM

## 2022-01-24 PROCEDURE — 99285 EMERGENCY DEPT VISIT HI MDM: CPT | Mod: 25 | Performed by: STUDENT IN AN ORGANIZED HEALTH CARE EDUCATION/TRAINING PROGRAM

## 2022-01-24 PROCEDURE — 36415 COLL VENOUS BLD VENIPUNCTURE: CPT | Performed by: STUDENT IN AN ORGANIZED HEALTH CARE EDUCATION/TRAINING PROGRAM

## 2022-01-24 PROCEDURE — 85014 HEMATOCRIT: CPT | Performed by: STUDENT IN AN ORGANIZED HEALTH CARE EDUCATION/TRAINING PROGRAM

## 2022-01-24 PROCEDURE — 99285 EMERGENCY DEPT VISIT HI MDM: CPT | Performed by: STUDENT IN AN ORGANIZED HEALTH CARE EDUCATION/TRAINING PROGRAM

## 2022-01-24 PROCEDURE — 80053 COMPREHEN METABOLIC PANEL: CPT | Performed by: STUDENT IN AN ORGANIZED HEALTH CARE EDUCATION/TRAINING PROGRAM

## 2022-01-24 PROCEDURE — 250N000009 HC RX 250: Performed by: STUDENT IN AN ORGANIZED HEALTH CARE EDUCATION/TRAINING PROGRAM

## 2022-01-24 PROCEDURE — 81003 URINALYSIS AUTO W/O SCOPE: CPT | Performed by: STUDENT IN AN ORGANIZED HEALTH CARE EDUCATION/TRAINING PROGRAM

## 2022-01-24 PROCEDURE — 250N000013 HC RX MED GY IP 250 OP 250 PS 637: Performed by: STUDENT IN AN ORGANIZED HEALTH CARE EDUCATION/TRAINING PROGRAM

## 2022-01-24 RX ORDER — LIDOCAINE HYDROCHLORIDE 20 MG/ML
15 SOLUTION OROPHARYNGEAL ONCE
Status: COMPLETED | OUTPATIENT
Start: 2022-01-24 | End: 2022-01-24

## 2022-01-24 RX ORDER — MAGNESIUM HYDROXIDE/ALUMINUM HYDROXICE/SIMETHICONE 120; 1200; 1200 MG/30ML; MG/30ML; MG/30ML
15 SUSPENSION ORAL ONCE
Status: COMPLETED | OUTPATIENT
Start: 2022-01-24 | End: 2022-01-24

## 2022-01-24 RX ADMIN — MAGNESIUM HYDROXIDE/ALUMINUM HYDROXICE/SIMETHICONE 15 ML: 120; 1200; 1200 SUSPENSION ORAL at 23:17

## 2022-01-24 RX ADMIN — LIDOCAINE HYDROCHLORIDE 15 ML: 20 SOLUTION ORAL; TOPICAL at 23:17

## 2022-01-24 ASSESSMENT — MIFFLIN-ST. JEOR: SCORE: 1632.89

## 2022-01-25 ENCOUNTER — APPOINTMENT (OUTPATIENT)
Dept: CT IMAGING | Facility: OTHER | Age: 47
End: 2022-01-25
Attending: STUDENT IN AN ORGANIZED HEALTH CARE EDUCATION/TRAINING PROGRAM

## 2022-01-25 VITALS
SYSTOLIC BLOOD PRESSURE: 104 MMHG | DIASTOLIC BLOOD PRESSURE: 62 MMHG | OXYGEN SATURATION: 94 % | HEIGHT: 71 IN | BODY MASS INDEX: 22.4 KG/M2 | HEART RATE: 58 BPM | TEMPERATURE: 98.3 F | WEIGHT: 160 LBS | RESPIRATION RATE: 16 BRPM

## 2022-01-25 PROBLEM — K35.80 ACUTE APPENDICITIS, UNSPECIFIED ACUTE APPENDICITIS TYPE: Status: ACTIVE | Noted: 2022-01-25

## 2022-01-25 PROBLEM — R10.31 ABDOMINAL PAIN, RIGHT LOWER QUADRANT: Status: ACTIVE | Noted: 2022-01-25

## 2022-01-25 PROBLEM — A04.72 C. DIFFICILE COLITIS: Status: ACTIVE | Noted: 2022-01-25

## 2022-01-25 PROBLEM — F17.210 CIGARETTE SMOKER: Status: ACTIVE | Noted: 2022-01-25

## 2022-01-25 LAB
ANION GAP SERPL CALCULATED.3IONS-SCNC: 7 MMOL/L (ref 3–14)
BASOPHILS # BLD AUTO: 0 10E3/UL (ref 0–0.2)
BASOPHILS NFR BLD AUTO: 0 %
BUN SERPL-MCNC: 18 MG/DL (ref 7–25)
C DIFF TOX B STL QL: POSITIVE
CALCIUM SERPL-MCNC: 9.2 MG/DL (ref 8.6–10.3)
CHLORIDE BLD-SCNC: 102 MMOL/L (ref 98–107)
CO2 SERPL-SCNC: 28 MMOL/L (ref 21–31)
CREAT SERPL-MCNC: 0.92 MG/DL (ref 0.7–1.3)
EOSINOPHIL # BLD AUTO: 0.1 10E3/UL (ref 0–0.7)
EOSINOPHIL NFR BLD AUTO: 1 %
ERYTHROCYTE [DISTWIDTH] IN BLOOD BY AUTOMATED COUNT: 13.2 % (ref 10–15)
GFR SERPL CREATININE-BSD FRML MDRD: >90 ML/MIN/1.73M2
GLUCOSE BLD-MCNC: 122 MG/DL (ref 70–105)
HCT VFR BLD AUTO: 38.6 % (ref 40–53)
HGB BLD-MCNC: 13.1 G/DL (ref 13.3–17.7)
IMM GRANULOCYTES # BLD: 0.1 10E3/UL
IMM GRANULOCYTES NFR BLD: 1 %
LYMPHOCYTES # BLD AUTO: 2 10E3/UL (ref 0.8–5.3)
LYMPHOCYTES NFR BLD AUTO: 15 %
MCH RBC QN AUTO: 31.3 PG (ref 26.5–33)
MCHC RBC AUTO-ENTMCNC: 33.9 G/DL (ref 31.5–36.5)
MCV RBC AUTO: 92 FL (ref 78–100)
MONOCYTES # BLD AUTO: 1 10E3/UL (ref 0–1.3)
MONOCYTES NFR BLD AUTO: 8 %
NEUTROPHILS # BLD AUTO: 9.9 10E3/UL (ref 1.6–8.3)
NEUTROPHILS NFR BLD AUTO: 75 %
NRBC # BLD AUTO: 0 10E3/UL
NRBC BLD AUTO-RTO: 0 /100
PLATELET # BLD AUTO: 311 10E3/UL (ref 150–450)
POTASSIUM BLD-SCNC: 4.3 MMOL/L (ref 3.5–5.1)
RBC # BLD AUTO: 4.19 10E6/UL (ref 4.4–5.9)
RIBOTYPE 027/NAP1/BI: ABNORMAL
SARS-COV-2 RNA RESP QL NAA+PROBE: NEGATIVE
SODIUM SERPL-SCNC: 137 MMOL/L (ref 134–144)
WBC # BLD AUTO: 13.1 10E3/UL (ref 4–11)

## 2022-01-25 PROCEDURE — 250N000011 HC RX IP 250 OP 636: Performed by: STUDENT IN AN ORGANIZED HEALTH CARE EDUCATION/TRAINING PROGRAM

## 2022-01-25 PROCEDURE — 87493 C DIFF AMPLIFIED PROBE: CPT | Performed by: SURGERY

## 2022-01-25 PROCEDURE — 99234 HOSP IP/OBS SM DT SF/LOW 45: CPT | Performed by: FAMILY MEDICINE

## 2022-01-25 PROCEDURE — 250N000013 HC RX MED GY IP 250 OP 250 PS 637: Performed by: FAMILY MEDICINE

## 2022-01-25 PROCEDURE — G0378 HOSPITAL OBSERVATION PER HR: HCPCS

## 2022-01-25 PROCEDURE — 74177 CT ABD & PELVIS W/CONTRAST: CPT | Mod: TC

## 2022-01-25 PROCEDURE — 96376 TX/PRO/DX INJ SAME DRUG ADON: CPT

## 2022-01-25 PROCEDURE — C9803 HOPD COVID-19 SPEC COLLECT: HCPCS | Performed by: STUDENT IN AN ORGANIZED HEALTH CARE EDUCATION/TRAINING PROGRAM

## 2022-01-25 PROCEDURE — 96365 THER/PROPH/DIAG IV INF INIT: CPT | Mod: XU | Performed by: STUDENT IN AN ORGANIZED HEALTH CARE EDUCATION/TRAINING PROGRAM

## 2022-01-25 PROCEDURE — 36415 COLL VENOUS BLD VENIPUNCTURE: CPT | Performed by: STUDENT IN AN ORGANIZED HEALTH CARE EDUCATION/TRAINING PROGRAM

## 2022-01-25 PROCEDURE — 80048 BASIC METABOLIC PNL TOTAL CA: CPT | Performed by: STUDENT IN AN ORGANIZED HEALTH CARE EDUCATION/TRAINING PROGRAM

## 2022-01-25 PROCEDURE — 85025 COMPLETE CBC W/AUTO DIFF WBC: CPT | Performed by: STUDENT IN AN ORGANIZED HEALTH CARE EDUCATION/TRAINING PROGRAM

## 2022-01-25 PROCEDURE — 99203 OFFICE O/P NEW LOW 30 MIN: CPT | Mod: 25 | Performed by: SURGERY

## 2022-01-25 PROCEDURE — 258N000003 HC RX IP 258 OP 636: Performed by: STUDENT IN AN ORGANIZED HEALTH CARE EDUCATION/TRAINING PROGRAM

## 2022-01-25 PROCEDURE — U0003 INFECTIOUS AGENT DETECTION BY NUCLEIC ACID (DNA OR RNA); SEVERE ACUTE RESPIRATORY SYNDROME CORONAVIRUS 2 (SARS-COV-2) (CORONAVIRUS DISEASE [COVID-19]), AMPLIFIED PROBE TECHNIQUE, MAKING USE OF HIGH THROUGHPUT TECHNOLOGIES AS DESCRIBED BY CMS-2020-01-R: HCPCS | Performed by: STUDENT IN AN ORGANIZED HEALTH CARE EDUCATION/TRAINING PROGRAM

## 2022-01-25 RX ORDER — ONDANSETRON 2 MG/ML
4 INJECTION INTRAMUSCULAR; INTRAVENOUS EVERY 6 HOURS PRN
Status: DISCONTINUED | OUTPATIENT
Start: 2022-01-25 | End: 2022-01-25 | Stop reason: HOSPADM

## 2022-01-25 RX ORDER — ONDANSETRON 4 MG/1
4 TABLET, ORALLY DISINTEGRATING ORAL EVERY 6 HOURS PRN
Status: DISCONTINUED | OUTPATIENT
Start: 2022-01-25 | End: 2022-01-25 | Stop reason: HOSPADM

## 2022-01-25 RX ORDER — NICOTINE 21 MG/24HR
1 PATCH, TRANSDERMAL 24 HOURS TRANSDERMAL DAILY
Status: DISCONTINUED | OUTPATIENT
Start: 2022-01-25 | End: 2022-01-25 | Stop reason: HOSPADM

## 2022-01-25 RX ORDER — NALOXONE HYDROCHLORIDE 0.4 MG/ML
0.2 INJECTION, SOLUTION INTRAMUSCULAR; INTRAVENOUS; SUBCUTANEOUS
Status: DISCONTINUED | OUTPATIENT
Start: 2022-01-25 | End: 2022-01-25 | Stop reason: HOSPADM

## 2022-01-25 RX ORDER — IOPAMIDOL 755 MG/ML
100 INJECTION, SOLUTION INTRAVASCULAR ONCE
Status: COMPLETED | OUTPATIENT
Start: 2022-01-25 | End: 2022-01-25

## 2022-01-25 RX ORDER — MORPHINE SULFATE 2 MG/ML
2-4 INJECTION, SOLUTION INTRAMUSCULAR; INTRAVENOUS
Status: DISCONTINUED | OUTPATIENT
Start: 2022-01-25 | End: 2022-01-25 | Stop reason: HOSPADM

## 2022-01-25 RX ORDER — NALOXONE HYDROCHLORIDE 0.4 MG/ML
0.4 INJECTION, SOLUTION INTRAMUSCULAR; INTRAVENOUS; SUBCUTANEOUS
Status: DISCONTINUED | OUTPATIENT
Start: 2022-01-25 | End: 2022-01-25 | Stop reason: HOSPADM

## 2022-01-25 RX ORDER — VANCOMYCIN HYDROCHLORIDE 125 MG/1
125 CAPSULE ORAL 4 TIMES DAILY
Status: DISCONTINUED | OUTPATIENT
Start: 2022-01-25 | End: 2022-01-25 | Stop reason: HOSPADM

## 2022-01-25 RX ORDER — VANCOMYCIN HYDROCHLORIDE 125 MG/1
125 CAPSULE ORAL 4 TIMES DAILY
Qty: 56 CAPSULE | Refills: 0 | Status: SHIPPED | OUTPATIENT
Start: 2022-01-25 | End: 2022-02-08

## 2022-01-25 RX ORDER — CEFOXITIN 1 G/1
1 INJECTION, POWDER, FOR SOLUTION INTRAVENOUS EVERY 6 HOURS
Status: DISCONTINUED | OUTPATIENT
Start: 2022-01-25 | End: 2022-01-25

## 2022-01-25 RX ORDER — SODIUM CHLORIDE 9 MG/ML
INJECTION, SOLUTION INTRAVENOUS CONTINUOUS
Status: DISCONTINUED | OUTPATIENT
Start: 2022-01-25 | End: 2022-01-25 | Stop reason: HOSPADM

## 2022-01-25 RX ORDER — SODIUM CHLORIDE 9 MG/ML
INJECTION, SOLUTION INTRAVENOUS CONTINUOUS
Status: DISCONTINUED | OUTPATIENT
Start: 2022-01-25 | End: 2022-01-25

## 2022-01-25 RX ADMIN — SODIUM CHLORIDE 1000 ML: 9 INJECTION, SOLUTION INTRAVENOUS at 00:43

## 2022-01-25 RX ADMIN — VANCOMYCIN HYDROCHLORIDE 125 MG: 125 CAPSULE ORAL at 10:44

## 2022-01-25 RX ADMIN — SODIUM CHLORIDE: 9 INJECTION, SOLUTION INTRAVENOUS at 05:00

## 2022-01-25 RX ADMIN — SODIUM CHLORIDE: 9 INJECTION, SOLUTION INTRAVENOUS at 01:59

## 2022-01-25 RX ADMIN — IOPAMIDOL 100 ML: 755 INJECTION, SOLUTION INTRAVENOUS at 00:37

## 2022-01-25 RX ADMIN — CEFOXITIN SODIUM 1 G: 1 POWDER, FOR SOLUTION INTRAVENOUS at 01:59

## 2022-01-25 RX ADMIN — CEFOXITIN SODIUM 1 G: 1 POWDER, FOR SOLUTION INTRAVENOUS at 08:23

## 2022-01-25 RX ADMIN — NICOTINE 1 PATCH: 21 PATCH, EXTENDED RELEASE TRANSDERMAL at 08:32

## 2022-01-25 NOTE — ED TRIAGE NOTES
"ED Nursing Triage Note (General)   ________________________________    Ankush Gamez is a 46 year old Male that presents to triage private car  With history of  Abdominal pain that is intermittent sharp pains in the middle of his stomach reported by patient   Significant symptoms had onset started 6 hours ago {/67   Pulse 69   Temp 97.5  F (36.4  C) (Temporal)   Resp 17   Ht 1.803 m (5' 11\")   Wt 72.6 kg (160 lb)   SpO2 97%   BMI 22.32 kg/m  t  Patient appears alert , in mild distress., and cooperative behavior.    GCS Total = 15  Airway: intact  Breathing noted as Normal  Circulation Normal  Skin:  Normal    Patient took girlfriends cipro 1 pill x 3 days stopped today and also took maalox today     PRE HOSPITAL PRIOR LIVING SITUATION Significant Other at home  "

## 2022-01-25 NOTE — H&P
[facility]    History and Physical - Hospitalist Service       Date of Admission:  1/24/2022    Assessment & Plan      Ankush Gamez is a 46 year old male admitted on 1/24/2022. He presents to the ER with 1 day history of abdominal pain.  Localizing to the lower abdomen associate with nausea, vomiting and some diarrhea.  He denies fever, chills, no other illness including cough, shortness of breath or urinary symptoms.  Evaluation in the ER showing elevated white count of 19,000 with CT imaging showing mildly dilated appendix consistent with possible early appendicitis.  There is no sign of perforation or surrounding inflammation.  ER physician spoke with the on-call general surgeon with recommendation of observation stay, he has been started on cefoxitin, IV fluids and he is currently n.p.o. awaiting consult with general surgery regarding possible surgery.  Patient is in generally good health with history of methamphetamine abuse, currently sober for several months and is an ongoing tobacco user.  Patient is medically cleared for surgery and anesthesia.    Acute appendicitis, unspecified acute appendicitis type  1 day history of abdominal pain associated with nausea, occasional vomiting and diarrhea  CT imaging showing mild enlargement of the appendix without evidence of abscess formation.  White blood cell count elevated, with normal urine  After phone consultation with general surgery, patient has been registered observation, started on cefoxitin with the plan for general surgery to see this a.m. 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)  History of methamphetamine abuse, no use since Thanksgiving  Encourage ongoing abstinence    Cigarette smoker  Daily smoker, 1 pack cigarette daily  Nicotine patch ordered         Diet:  NPO per Anesthesia Guidelines for Procedure/Surgery Except for: Meds    DVT Prophylaxis: Low Risk/Ambulatory with no VTE prophylaxis indicated  Haines Catheter: Not present  Central Lines: None  Cardiac Monitoring: None  Code Status: Full Code      Clinically Significant Risk Factors Present on Admission                    Disposition Plan   Expected Discharge:  today  Anticipated discharge location:  Awaiting care coordination huddle likely home  Delays:  After general surgery evaluation, possible surgery       The patient's care was discussed with the Patient.    Ramsey Pinedo MD  Hospitalist Service  [facility]  Securely message with the Vocera Web Console (learn more here)  Text page via [facility] Paging/Directory         ______________________________________________________________________    Chief Complaint   46-year-old male presenting with abdominal pain    History is obtained from the patient, electronic health record and emergency department physician    History of Present Illness   Ankush Gamez is a 46 year old male who presents to the ER with abdominal pain.  This started the morning of the visit described as a achy intermittent pain in the lower abdomen.  At times sharp, associate with nausea and vomiting and some loose stools without blood.  He denies fever chills, cough, shortness of breath.  There is been no urinary symptoms.  He has never had this pain before.  No previous history of abdominal surgery.  Generally in good health with history of methamphetamine abuse, sober since Thanksgiving, ongoing smoking history daily 1 pack cigarettes daily.  This morning after staying here overnight on IV fluids is feeling somewhat better.  He denies any nausea at this time.    Review of Systems    All other systems reviewed and negative other than noted in the HPI or here.       Past Medical History    I have reviewed this patient's medical history and updated it with pertinent  information if needed.   History reviewed. No pertinent past medical history.    Past Surgical History   I have reviewed this patient's surgical history and updated it with pertinent information if needed.  History reviewed. No pertinent surgical history.    Social History   I have reviewed this patient's social history and updated it with pertinent information if needed.  Social History     Tobacco Use     Smoking status: Current Every Day Smoker     Packs/day: 0.50     Types: Cigarettes     Smokeless tobacco: Never Used   Substance Use Topics     Alcohol use: No     Alcohol/week: 0.0 standard drinks     Drug use: Yes     Types: Methamphetamines, Other     Comment: Last 20 years       Family History   I have reviewed this patient's family history and updated it with pertinent information if needed.  Family History   Problem Relation Age of Onset     Diabetes Mother        Prior to Admission Medications   Prior to Admission Medications   Prescriptions Last Dose Informant Patient Reported? Taking?   omeprazole (PRILOSEC) 40 MG capsule 1/24/2022 at Unknown time  Yes Yes   Sig: Take 40 mg by mouth      Facility-Administered Medications: None     Allergies   No Known Allergies    Physical Exam   Vital Signs: Temp: 98.5  F (36.9  C) Temp src: Tympanic BP: 102/49 Pulse: 63   Resp: 16 SpO2: 97 % O2 Device: None (Room air)    Weight: 160 lbs 0 oz    General Appearance: Appropriate age male in no apparent distress  Eyes: Pupils equal, reactive, EOM intact  HEENT: Nose mouth and throat normal  Respiratory: Lungs are clear  Cardiovascular: Regular rate and rhythm, no murmur heard  GI: Occasional bowel sounds.  Mild nonspecific pain in the lower abdomen without localization, guarding or rebound.  No masses  Lymph/Hematologic: Cervical nodes negative  Genitourinary: Not examined  Skin: No lesions, rashes or bruising  Musculoskeletal: Moving arms and legs no difficulty no joint changes  Neurologic: No focal  deficits,  Psychiatric: Mood and affect are normal    Data   Data reviewed today: I reviewed all medications, new labs and imaging results over the last 24 hours. I personally reviewed no images or EKG's today.    Results for orders placed or performed during the hospital encounter of 01/24/22   CT Abdomen Pelvis w Contrast     Status: None    Narrative    PROCEDURE INFORMATION:   Exam: CT Abdomen And Pelvis With Contrast   Exam date and time: 1/25/2022 12:30 AM   Age: 46 years old   Clinical indication: Generalized; Patient HX: Ankush haney is a 46 year old   male that presents to triage private car with history of abdominal pain that is   intermittent sharp pains in the middle of his stomach reported by patient;   Additional info: Diverticulitis?     TECHNIQUE:   Imaging protocol: Computed tomography of the abdomen and pelvis with contrast.   Radiation optimization: All CT scans at this facility use at least one of these   dose optimization techniques: automated exposure control; mA and/or kV   adjustment per patient size (includes targeted exams where dose is matched to   clinical indication); or iterative reconstruction.   Contrast material: ISOVUE 370; Contrast volume: 100 ml; Contrast route:   INTRAVENOUS (IV);      COMPARISON:   CT ABDOMEN PELVIS W 2/21/2016 12:44 AM     FINDINGS:   Liver: Fatty infiltration.   Gallbladder and bile ducts: No calcified stones.    Pancreas: Unremarkable.   Spleen: Unremarkable.   Adrenal glands: Normal. No mass.   Kidneys and ureters: Nephrolithiasis. Right-sided.   Stomach and bowel: No obstruction.   Appendix: Mildly dilated appendix.     Intraperitoneal space: No free air. No abscess. Mild free fluid.   Vasculature: Unremarkable.   Lymph nodes: No significant adenopathy.   Urinary bladder: Unremarkable as visualized.   Reproductive: Unremarkable.   Bones/joints: No acute fracture.   Soft tissues: Tiny umbilical hernia.       Impression    IMPRESSION:   1. Findings are  suspicious for very early acute appendicitis. Please clinically   correlate.   2. Right-sided extrarenal pelvis versus UPJ obstruction, improved from the   prior study.     THIS DOCUMENT HAS BEEN ELECTRONICALLY SIGNED BY ANNIE CORTEZ MD   UA reflex to Microscopic     Status: Normal   Result Value Ref Range    Color Urine Light Yellow Colorless, Straw, Light Yellow, Yellow    Appearance Urine Clear Clear    Glucose Urine Negative Negative mg/dL    Bilirubin Urine Negative Negative    Ketones Urine Negative Negative mg/dL    Specific Gravity Urine 1.019 1.000 - 1.030    Blood Urine Negative Negative    pH Urine 6.0 5.0 - 9.0    Protein Albumin Urine Negative Negative mg/dL    Urobilinogen Urine Normal Normal, 2.0 mg/dL    Nitrite Urine Negative Negative    Leukocyte Esterase Urine Negative Negative    Narrative    Microscopic not indicated   Comprehensive metabolic panel     Status: Abnormal   Result Value Ref Range    Sodium 137 134 - 144 mmol/L    Potassium 4.0 3.5 - 5.1 mmol/L    Chloride 99 98 - 107 mmol/L    Carbon Dioxide (CO2) 28 21 - 31 mmol/L    Anion Gap 10 3 - 14 mmol/L    Urea Nitrogen 21 7 - 25 mg/dL    Creatinine 0.91 0.70 - 1.30 mg/dL    Calcium 10.0 8.6 - 10.3 mg/dL    Glucose 106 (H) 70 - 105 mg/dL    Alkaline Phosphatase 68 34 - 104 U/L    AST 20 13 - 39 U/L    ALT 17 7 - 52 U/L    Protein Total 7.4 6.4 - 8.9 g/dL    Albumin 4.4 3.5 - 5.7 g/dL    Bilirubin Total 0.7 0.3 - 1.0 mg/dL    GFR Estimate >90 >60 mL/min/1.73m2   Lipase     Status: Normal   Result Value Ref Range    Lipase 19 11 - 82 U/L   CBC with platelets and differential     Status: Abnormal   Result Value Ref Range    WBC Count 19.7 (H) 4.0 - 11.0 10e3/uL    RBC Count 4.65 4.40 - 5.90 10e6/uL    Hemoglobin 14.6 13.3 - 17.7 g/dL    Hematocrit 42.2 40.0 - 53.0 %    MCV 91 78 - 100 fL    MCH 31.4 26.5 - 33.0 pg    MCHC 34.6 31.5 - 36.5 g/dL    RDW 13.1 10.0 - 15.0 %    Platelet Count 342 150 - 450 10e3/uL    % Neutrophils 79 %    %  Lymphocytes 11 %    % Monocytes 8 %    % Eosinophils 1 %    % Basophils 0 %    % Immature Granulocytes 1 %    NRBCs per 100 WBC 0 <1 /100    Absolute Neutrophils 15.7 (H) 1.6 - 8.3 10e3/uL    Absolute Lymphocytes 2.2 0.8 - 5.3 10e3/uL    Absolute Monocytes 1.6 (H) 0.0 - 1.3 10e3/uL    Absolute Eosinophils 0.1 0.0 - 0.7 10e3/uL    Absolute Basophils 0.1 0.0 - 0.2 10e3/uL    Absolute Immature Granulocytes 0.1 <=0.4 10e3/uL    Absolute NRBCs 0.0 10e3/uL   Asymptomatic COVID-19 Virus (Coronavirus) by PCR Nose     Status: Normal    Specimen: Nose; Swab   Result Value Ref Range    SARS CoV2 PCR Negative Negative    Narrative    Testing was performed using the Xpert Xpress SARS-CoV-2 Assay on the   Cepheid Gene-Xpert Instrument Systems. Additional information about   this Emergency Use Authorization (EUA) assay can be found via the Lab   Guide. This test should be ordered for the detection of SARS-CoV-2 in   individuals who meet SARS-CoV-2 clinical and/or epidemiological   criteria. Test performance is unknown in asymptomatic patients. This   test is for in vitro diagnostic use under the FDA EUA for   laboratories certified under CLIA to perform high complexity testing.   This test has not been FDA cleared or approved. A negative result   does not rule out the presence of PCR inhibitors in the specimen or   target RNA in concentration below the limit of detection for the   assay. The possibility of a false negative should be considered if   the patient's recent exposure or clinical presentation suggests   COVID-19. This test was validated by [facility]. This laboratory is certified under the [facility]  al Laboratory Improvement Amendments (CLIA) as qualified to perform high complex  ity clinical laboratory testing.   Basic metabolic panel     Status: Abnormal   Result Value Ref Range    Sodium 137 134 - 144 mmol/L    Potassium 4.3 3.5 - 5.1 mmol/L    Chloride 102 98 - 107  mmol/L    Carbon Dioxide (CO2) 28 21 - 31 mmol/L    Anion Gap 7 3 - 14 mmol/L    Urea Nitrogen 18 7 - 25 mg/dL    Creatinine 0.92 0.70 - 1.30 mg/dL    Calcium 9.2 8.6 - 10.3 mg/dL    Glucose 122 (H) 70 - 105 mg/dL    GFR Estimate >90 >60 mL/min/1.73m2   CBC with platelets and differential     Status: Abnormal   Result Value Ref Range    WBC Count 13.1 (H) 4.0 - 11.0 10e3/uL    RBC Count 4.19 (L) 4.40 - 5.90 10e6/uL    Hemoglobin 13.1 (L) 13.3 - 17.7 g/dL    Hematocrit 38.6 (L) 40.0 - 53.0 %    MCV 92 78 - 100 fL    MCH 31.3 26.5 - 33.0 pg    MCHC 33.9 31.5 - 36.5 g/dL    RDW 13.2 10.0 - 15.0 %    Platelet Count 311 150 - 450 10e3/uL    % Neutrophils 75 %    % Lymphocytes 15 %    % Monocytes 8 %    % Eosinophils 1 %    % Basophils 0 %    % Immature Granulocytes 1 %    NRBCs per 100 WBC 0 <1 /100    Absolute Neutrophils 9.9 (H) 1.6 - 8.3 10e3/uL    Absolute Lymphocytes 2.0 0.8 - 5.3 10e3/uL    Absolute Monocytes 1.0 0.0 - 1.3 10e3/uL    Absolute Eosinophils 0.1 0.0 - 0.7 10e3/uL    Absolute Basophils 0.0 0.0 - 0.2 10e3/uL    Absolute Immature Granulocytes 0.1 <=0.4 10e3/uL    Absolute NRBCs 0.0 10e3/uL   CBC with platelets differential     Status: Abnormal    Narrative    The following orders were created for panel order CBC with platelets differential.  Procedure                               Abnormality         Status                     ---------                               -----------         ------                     CBC with platelets and d...[[id number]]  Abnormal            Final result                 Please view results for these tests on the individual orders.   CBC with platelets differential     Status: Abnormal    Narrative    The following orders were created for panel order CBC with platelets differential.  Procedure                               Abnormality         Status                     ---------                               -----------         ------                     CBC with platelets and  d...[[id number]]  Abnormal            Final result                 Please view results for these tests on the individual orders.

## 2022-01-25 NOTE — PROGRESS NOTES
NSG DISCHARGE NOTE    Patient discharged to home at 1254 PM via wheel chair. Accompanied by significant other and staff. Discharge instructions reviewed with patient, opportunity offered to ask questions. Prescriptions sent to patients preferred pharmacy. All belongings sent with patient.    Randi Barba RN

## 2022-01-25 NOTE — ASSESSMENT & PLAN NOTE
Presenting with one day hx of abdominal pain with vomiting, diarrhea  Work-up in ED with leukocytosis and CT worrisome for early appendicitis  Recent hx notable for exposure to c diff through girlfriend and use of her antibiotics (septra) for several days prior to sxs  Testing in hospital positive for c diff  Will start oral vancomycin for 14 days,   Since feeling better will discharge home, follow-up if worsening sxs.

## 2022-01-25 NOTE — ASSESSMENT & PLAN NOTE
1 day history of abdominal pain associated with nausea, occasional vomiting and diarrhea  CT imaging showing mild enlargement of the appendix without evidence of abscess formation.  White blood cell count elevated, with normal urine  After phone consultation with general surgery, patient has been registered observation, started on cefoxitin with the plan for general surgery to see this a.m. for consideration for possible surgery  History significant for recent exposure to c. Diff through girlfriend. Patient also has taken bactrim at home for tooth issues  C. Diff testing positive, will start on oral vancomycin  Seen by general surgery, no surgery recommended  Feeling better, home today on oral vancomycin for 2 weeks

## 2022-01-25 NOTE — PROVIDER NOTIFICATION
01/25/22 0356   Valuables   Patient Belongings remains with patient   Patient Belongings Remaining with Patient cell phone/electronics;clothing   Did you bring any home meds/supplements to the hospital?  No   List items sent to safe: none, clothing and cell phone with patient.    I have reviewed my belongings list on admission and verify that it is correct.     Patient signature_______________________________      I have received all my belongings noted above at discharge.    Patient signature________________________________  Ryland Burch RN on 1/25/2022 at 3:57 AM

## 2022-01-25 NOTE — PLAN OF CARE
VSS and recorded. Patient alert and orientated x4. Up in room independently. Surgical shower scrub completed x 1 around 0400. Patient has verbally denied pain, but some mild guarding of abdomen noted. Abdomen is soft. No nausea or vomiting.  Ryland Burch RN on 1/25/2022 at 6:00 AM

## 2022-01-25 NOTE — DISCHARGE SUMMARY
[facility]  Hospitalist Discharge Summary      Date of Admission:  1/24/2022  Date of Discharge:  1/25/2022  Discharging Provider: Ramsey Pinedo MD  Discharge Service: Hospitalist Service    Discharge Diagnoses   Principal Problem:    C. 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  No follow up labs or test are needed.  Follow back with ER if symptoms acutely worsen, not able to eat, worsening   diarrhea             Unresulted Labs Ordered in the Past 30 Days of this Admission     No orders found for last 31 day(s).      These results will be followed up by primary care    Discharge Disposition   Discharged to home  Condition at discharge: Satisfactory      Hospital Course   Abdominal pain, right lower quadrant  1 day history of abdominal pain associated with nausea, occasional vomiting and diarrhea  CT imaging showing mild enlargement of the appendix without evidence of abscess formation.  White blood cell count elevated, with normal urine  After phone consultation with general surgery, patient has been registered observation, started on cefoxitin with the plan for general surgery to see this a.m. for consideration for possible surgery  History significant for recent exposure to c. Diff through girlfriend. Patient also has taken bactrim at home for tooth issues  C. 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 difficile colitis  Presenting with one day hx of abdominal pain with vomiting, diarrhea  Work-up in ED with leukocytosis and CT worrisome for early appendicitis  Recent hx notable for exposure to c diff through girlfriend and use of her antibiotics (septra) for several days prior to sxs  Testing in hospital positive for c diff  Will start oral vancomycin for 14 days,   Since feeling better will discharge home, follow-up if worsening sxs.        Consultations This Hospital Stay   SURGERY GENERAL IP CONSULT    Code Status   Full Code    Time Spent on this Encounter   I, Ramsey Pinedo MD, personally saw the patient today and spent less than or equal to 30 minutes discharging this patient.       Ramsey Pinedo MD  [facility]  [address]  [place] [facility] [facility] [address]  Phone: [phone]  Fax: [phone]  ______________________________________________________________________    Physical Exam   Vital Signs: Temp: 98.3  F (36.8  C) Temp src: Tympanic BP: 104/62 Pulse: 58   Resp: 16 SpO2: 94 % O2 Device: None (Room air)    Weight: 160 lbs 0 oz  Constitutional: awake, alert, cooperative, no apparent distress, and appears stated age  Respiratory: No increased work of breathing, good air exchange, clear to auscultation bilaterally, no crackles or wheezing  Cardiovascular: Normal apical impulse, regular rate and rhythm, normal S1 and S2, no S3 or S4, and no murmur noted  GI: mild lower abdominal tenderness with no localized pain or rebound       Primary Care Physician   Physician No Ref-Primary    Discharge Orders      Reason for your hospital stay    Admitted with abdominal pain and vomiting and diarrhea. Initial concern was possible appendicitis with testing positive for c.diff colitis.     Follow-up and recommended labs and tests     Follow up with primary care provider, Physician No Ref-Primary, within 7  days to evaluate medication change.  No follow up labs or test are needed.  Follow back with ER if symptoms acutely worsen, not able to eat, worsening diarrhea     Activity    Your activity upon discharge: activity as tolerated     Diet    Follow this diet upon discharge: Orders Placed This Encounter      Advance Diet as Tolerated: Clear Liquid Diet, advance as able       Significant Results and Procedures   Most Recent 3 CBC's:Recent Labs   Lab Test 01/25/22  0519 01/24/22  2325 12/31/21  0404   WBC 13.1* 19.7* 11.5*   HGB 13.1* 14.6 13.2*   MCV 92 91 93    342 321     Most Recent 3 BMP's:Recent Labs   Lab Test 01/25/22  0519 01/24/22 2325 12/31/21  0404    137 139   POTASSIUM 4.3 4.0 3.8   CHLORIDE 102 99 106   CO2 28 28 27   BUN 18 21 25   CR 0.92 0.91 0.92   ANIONGAP 7 10 6   CARLIE 9.2 10.0 9.2   * 106* 107*     Most Recent 6 Bacteria Isolates From Any Culture (See EPIC Reports for Culture Details):No lab results found.,   Results for orders placed or performed during the hospital encounter of 01/24/22   CT Abdomen Pelvis w Contrast    Narrative    PROCEDURE INFORMATION:   Exam: CT Abdomen And Pelvis With Contrast   Exam date and time: 1/25/2022 12:30 AM   Age: 46 years old   Clinical indication: Generalized; Patient HX: Ankush haney is a 46 year old   male that presents to triage private car with history of abdominal pain that is   intermittent sharp pains in the middle of his stomach reported by patient;   Additional info: Diverticulitis?     TECHNIQUE:   Imaging protocol: Computed tomography of the abdomen and pelvis with contrast.   Radiation optimization: All CT scans at this facility use at least one of these   dose optimization techniques: automated exposure control; mA and/or kV   adjustment per patient size (includes targeted exams where dose is matched to   clinical indication); or iterative reconstruction.   Contrast material: ISOVUE 370; Contrast volume: 100 ml; Contrast route:    INTRAVENOUS (IV);      COMPARISON:   CT ABDOMEN PELVIS W 2/21/2016 12:44 AM     FINDINGS:   Liver: Fatty infiltration.   Gallbladder and bile ducts: No calcified stones.    Pancreas: Unremarkable.   Spleen: Unremarkable.   Adrenal glands: Normal. No mass.   Kidneys and ureters: Nephrolithiasis. Right-sided.   Stomach and bowel: No obstruction.   Appendix: Mildly dilated appendix.     Intraperitoneal space: No free air. No abscess. Mild free fluid.   Vasculature: Unremarkable.   Lymph nodes: No significant adenopathy.   Urinary bladder: Unremarkable as visualized.   Reproductive: Unremarkable.   Bones/joints: No acute fracture.   Soft tissues: Tiny umbilical hernia.       Impression    IMPRESSION:   1. Findings are suspicious for very early acute appendicitis. Please clinically   correlate.   2. Right-sided extrarenal pelvis versus UPJ obstruction, improved from the   prior study.     THIS DOCUMENT HAS BEEN ELECTRONICALLY SIGNED BY ANNIE CORTEZ MD       Discharge Medications   Current Discharge Medication List      START taking these medications    Details   vancomycin (VANCOCIN) 125 MG capsule Take 1 capsule (125 mg) by mouth 4 times daily for 14 days  Qty: 56 capsule, Refills: 0    Associated Diagnoses: C. difficile colitis         CONTINUE these medications which have NOT CHANGED    Details   esomeprazole (NEXIUM) 20 MG DR capsule Take 40 mg by mouth every morning (before breakfast) Take 30-60 minutes before eating.           Allergies   No Known Allergies

## 2022-01-25 NOTE — H&P
GENERAL SURGERY CONSULTATION NOTE    Ankush Gamez   [address]  [facility] [address]  46 year old  male    Primary Care Provider:  No Ref-Primary, Physician      HPI: Ankush Gamez presents to ED with abdominal pain. Patient says pain is diffused and crampy. It seems to come and go. Pain is improved this morning. Pain is not worse with movement. Patient tolerated regular diet yesterday. States he feels hungry this morning.   Had one loose BM yesterday and one watery BM this morning, no blood. He is taking an antibiotic currently for an infected tooth.   He has not had pain like this in the past.     REVIEW OF SYSTEMS:    GENERAL: No fevers or chills. Denies fatigue, recent weight loss.  HEENT: No sinus drainage. No changes with vision or hearing. No difficulty swallowing.   LYMPHATICS:  Noswollen nodes in axilla, neck or groin.  CARDIOVASCULAR: Denies chest pain, palpitations and dyspnea on exertion.  PULMONARY: No shortness of breath or cough. No increase in sputum production.  GI: positive diarrhea   : No dysuria or hematuria.  SKIN: No recent rashes or ulcers.   HEMATOLOGY:  No history of easy bruising or bleeding.  ENDOCRINE:  No history of diabetes or thyroid problems.  NEUROLOGY:  No history of seizures or headaches. No motor or sensory changes.        Patient Active Problem List   Diagnosis     Adjustment disorder with mixed anxiety and depressed mood     Esophageal reflux     Methamphetamine abuse in remission (H)     Bilateral carpal tunnel syndrome     Acute appendicitis, unspecified acute appendicitis type     Cigarette smoker       History reviewed. No pertinent past medical history.    History reviewed. No pertinent surgical history.    Family History   Problem Relation Age of Onset     Diabetes Mother        Social History     Social History Narrative    Lives in [place].  Treatment for meth       Social History     Socioeconomic History     Marital status: Single     Spouse name: Not on file  "    Number of children: Not on file     Years of education: Not on file     Highest education level: Not on file   Occupational History     Not on file   Tobacco Use     Smoking status: Current Every Day Smoker     Packs/day: 0.50     Types: Cigarettes     Smokeless tobacco: Never Used   Substance and Sexual Activity     Alcohol use: No     Alcohol/week: 0.0 standard drinks     Drug use: Yes     Types: Methamphetamines, Other     Comment: Last 20 years     Sexual activity: Yes     Partners: Female   Other Topics Concern     Not on file   Social History Narrative    Lives in [place].  Treatment for meth     Social Determinants of Health     Financial Resource Strain: Not on file   Food Insecurity: Not on file   Transportation Needs: Not on file   Physical Activity: Not on file   Stress: Not on file   Social Connections: Not on file   Intimate Partner Violence: Not on file   Housing Stability: Not on file       No current facility-administered medications on file prior to encounter.  esomeprazole (NEXIUM) 20 MG DR capsule, Take 40 mg by mouth every morning (before breakfast) Take 30-60 minutes before eating.          ALLERGIES/SENSITIVITIES: No Known Allergies    PHYSICAL EXAM:     /62 (BP Location: Right arm)   Pulse 58   Temp 98.3  F (36.8  C) (Tympanic)   Resp 16   Ht 1.803 m (5' 11\")   Wt 72.6 kg (160 lb)   SpO2 94%   BMI 22.32 kg/m      General Appearance:   Sitting up in the chair, no apparent distress  HEENT: Pupils are equal and reactive, no scleral icterus,   Heart & CV:  RRR no murmur.  Intact distal pulses, good cap refill.  LUNGS: No increased work of breathing.Lugns are CTA B/L, no wheezing or crackles.  Abd:  Soft, non-tender, non-distended  Ext: normal bulk and tone, no lower extremity edema   Neuro: alert and oriented     WBC 13.1  Hgb 13.1  C diff POSITIVE      CONSULTATION ASSESSMENT AND PLAN:    46 year old male with abdominal pain and C diff. Will plan to treat as outpatient. OK to " discharge.          Ted Sinclair MD on 1/25/2022 at 9:38 AM

## 2022-01-25 NOTE — PHARMACY-ADMISSION MEDICATION HISTORY
Pharmacy -- Admission Medication Reconciliation    Prior to admission (PTA) medications were reviewed and the patient's PTA medication list was updated.    Sources Consulted: Patient, [facility]    The reliability of this Medication Reconciliation is: Reliability: Reliable    The following significant changes were made:  Removed omeprazole and added Nexium OTC per patient    In addition, the patient's allergies were reviewed with the patient and updated as follows:   Allergies: Patient has no known allergies.    The pharmacist has reviewed with the patient that all personal medications should be removed from the building or locked in the belongings safe.  Patient shall only take medications ordered by the physician and administered by the nursing staff.       Medication barriers identified: patient does not have any prescription drug coverage - he states he would easily qualify for the community care program if needed. [facility] was contacted and Nexium would be cheaper OTC than through the community care program.   Medication adherence concerns: none noted   Understanding of emergency medications: RICKIE Robins [facility], 1/25/2022,  8:22 AM

## 2022-01-25 NOTE — PHARMACY - DISCHARGE MEDICATION RECONCILIATION AND EDUCATION
Pharmacy:  Discharge Counseling and Medication Reconciliation    Ankush Gamez  [address] [address] [place] [id number]  [phone] (home)   46 year old male  PCP: No Ref-Primary, Physician    Allergies: Patient has no known allergies.    Discharge Counseling:    Pharmacist met with patient (and/or family) today to review the medication portion of the After Visit Summary (with an emphasis on NEW medications) and to address patient's questions/concerns.    Summary of Education: spoke with patient over the phone regarding new prescriptions: administration, duration and side effects.  Patient concerned with price, got community care pricing at [employer].  All questions answered.    Materials Provided:  MedCounselor sheets printed from Clinical Pharmacology on: vancomycin    Discharge Medication Reconciliation:    It has been determined that the patient has an adequate supply of medications available or which can be obtained from the patient's preferred pharmacy, which HE/SHE has confirmed as: [facility]     Thank you for the consult.    Solange Turcios RPH........January 25, 2022 11:03 AM

## 2022-01-25 NOTE — PROGRESS NOTES
" NS ADMISSION NOTE    Patient admitted to room 316 at approximately 0320 via wheel chair from emergency room. Patient was accompanied by other: friend.     Verbal SBAR report received from Nohemy prior to patient arrival.     Patient ambulated to bed independently. Patient alert and oriented X 3. The patient is not having any pain.  . Admission vital signs: Blood pressure 102/49, pulse 63, temperature 98.5  F (36.9  C), temperature source Tympanic, resp. rate 16, height 1.803 m (5' 11\"), weight 72.6 kg (160 lb), SpO2 97 %. Patient were oriented to plan of care, call light, bed controls, tv, telephone, bathroom and visiting hours.     Risk Assessment    The following safety risks were identified during admission: none. Yellow risk band applied: DOROTHEA Burch RN    "

## 2022-01-25 NOTE — ED PROVIDER NOTES
History     Chief Complaint   Patient presents with     Abdominal Pain     HPI    Ankush Gamez is a 46 year old male with past medical history of adjust disorder, esophageal reflux who presents with abdominal pain.  Patient has had 6 hours of lower quadrant abdominal pain started about abdominal pain.  Symptoms are achy and sharp.  Associate with nausea without vomiting.  Patient sharp pain.  Symptoms are severe nausea without vomiting.  No pain with urinating or urinary frequency.  He has had episodes of diarrhea that are nonbloody.  Denies any fevers or chills, chest pain, myalgias, headache, syncope or near syncope.  To his credit he has been sober from methamphetamines for 1.5 months which is fantastic.    Allergies:  No Known Allergies    Problem List:    Patient Active Problem List    Diagnosis Date Noted     Bilateral carpal tunnel syndrome 12/20/2019     Priority: Medium     Adjustment disorder with mixed anxiety and depressed mood 02/24/2008     Priority: Medium     Esophageal reflux 02/24/2008     Priority: Medium     Other, mixed, or unspecified nondependent drug abuse, in remission 02/24/2008     Priority: Medium        Past Medical History:    History reviewed. No pertinent past medical history.    Past Surgical History:    History reviewed. No pertinent surgical history.    Family History:    History reviewed. No pertinent family history.    Social History:  Marital Status:  Single [1]  Social History     Tobacco Use     Smoking status: Current Every Day Smoker     Packs/day: 0.50     Types: Cigarettes     Smokeless tobacco: Never Used   Substance Use Topics     Alcohol use: No     Alcohol/week: 0.0 standard drinks     Drug use: Yes     Types: Methamphetamines, Other     Comment: Last 20 years        Medications:    omeprazole (PRILOSEC) 40 MG capsule          Review of Systems  All systems reviewed and other than pertinent positives and negatives in HPI, all other systems are  "negaive      Physical Exam   BP: 123/67  Pulse: 69  Temp: 97.5  F (36.4  C)  Resp: 17  Height: 180.3 cm (5' 11\")  Weight: 72.6 kg (160 lb)  SpO2: 97 %    Vitals: /67   Pulse 69   Temp 97.5  F (36.4  C) (Temporal)   Resp 17   Ht 1.803 m (5' 11\")   Wt 72.6 kg (160 lb)   SpO2 97%   BMI 22.32 kg/m    Constitutional: awake, NAD  Eyes: No conjunctival injection and normal lids, PERRL, EOMI  ENT: external nose and ears atraumatic  Neck: Symmetric, trachea midline, Supple  CV: RRR, no murmurs appreciated.  Pulm: Unlabored respiratory effort, good air movement, CTAB, no w/c/r appreciated.  GI: Soft, suprapubic abdominal tenderness mild, nondistended.  No rebound or gaurding.  - Rovsing negative  - no CVA tenderness  MSK: No deformities.  No cyanosis.  Neuro: AOx4, normal speech, following commands, grossly symmetric strength in all extremities.  Cranial nerves III-XII grossly intact.   Skin: warm & dry, no rashes or lesions  Psych: Appropriate mood & affect.    Assessments & Plan (with Medical Decision Making)     Patient is a 46-year-old male with a history of esophageal reflux who presents with lower quadrant abdominal pain    ED Course  ED Course as of 01/25/22 0140   Mon Jan 24, 2022   2347 WBC(!): 19.7   2347 Hemoglobin: 14.6   2358 Lipase: 19   2358 Sodium: 137   2358 Potassium: 4.0   2358 Carbon Dioxide: 28   2358 Anion Gap: 10   2358 Urea Nitrogen: 21   2358 Creatinine: 0.91   2358 Alkaline Phosphatase: 68   2358 Glucose(!): 106   2358 ALT: 17   2358 AST: 20   2358 Bilirubin Total: 0.7   Tue Jan 25, 2022   0123 CT abdomen and pelvis    IMPRESSION:   1. Findings are suspicious for very early acute appendicitis. Please clinically   correlate.   2. Right-sided extrarenal pelvis versus UPJ obstruction, improved from the   prior study.        /67. HR 69. Temp 97.5F.  RR 17.  SpO2 96-97. Secondary exam revealed suprapubic abdominal tenderness, mild.  No CVA tenderness.  Nontoxic appearance.  White blood " cell count of 19.7, he will 14.6, platelet count 342.  Sodium 137, potassium 4.0.  Anion gap 10, urea nitrogen 21, creatinine 0.81.  Glucose 106, alk phos 68, AST 20, ALT 17, bilirubin 0.7.  Lipase 17.  CT abdomen pelvis reveals early acute appendicitis.  Symptoms do seem consistent with appendicitis given his nausea, lower quadrant abdominal pain.  He is given cefoxitin 1 g IV per surgery recommendations.  Liter normal saline, GI cocktail.  He has normal saline infusing at 100 cc/h.  Patient was admitted to medicine floor.      Plan  - CT abdomen and pelvis - findings suspicious for very early acute appendicitis.   - cefoxitin 1 g IV  - GI cocktail  - NS 1000 ml  -  ml/hr  - NPO  - COVID19 pending  - consult to general surgery, Dr. Ingram  - admit to medicine, Dr. Pinedo    Impression  Acute appendicitis    Disposition  medicine    I have reviewed the nursing notes.    I have reviewed the findings, diagnosis, plan and need for follow up with the patient.  Angel Munson MD  Emergency Medicine   1/24/2022   [facility]     Luis Munson MD  01/25/22 0147

## 2022-01-26 ENCOUNTER — PATIENT OUTREACH (OUTPATIENT)
Dept: CARE COORDINATION | Facility: CLINIC | Age: 47
End: 2022-01-26

## 2022-01-26 NOTE — PROGRESS NOTES
Clinic Care Coordination Contact  [facility]/Voicemail    Clinical Data: Care Coordinator Outreach  Outreach attempted x 4.  Left message on patient's voicemail with call back information and requested return call. No answer on other phone listed.   Plan: Care Coordinator unable to contact, will close encounter.   Randi Ngo, RN on 1/26/2022 at 11:10 AM

## 2022-04-11 ENCOUNTER — OFFICE VISIT (OUTPATIENT)
Dept: FAMILY MEDICINE | Facility: OTHER | Age: 47
End: 2022-04-11
Attending: FAMILY MEDICINE

## 2022-04-11 VITALS
SYSTOLIC BLOOD PRESSURE: 122 MMHG | HEART RATE: 72 BPM | WEIGHT: 168 LBS | HEIGHT: 71 IN | RESPIRATION RATE: 14 BRPM | DIASTOLIC BLOOD PRESSURE: 68 MMHG | TEMPERATURE: 99.1 F | OXYGEN SATURATION: 96 % | BODY MASS INDEX: 23.52 KG/M2

## 2022-04-11 DIAGNOSIS — H44.001 EYE INFECTION, RIGHT: Primary | ICD-10-CM

## 2022-04-11 PROCEDURE — 99213 OFFICE O/P EST LOW 20 MIN: CPT | Performed by: FAMILY MEDICINE

## 2022-04-11 RX ORDER — ERYTHROMYCIN 5 MG/G
0.5 OINTMENT OPHTHALMIC 2 TIMES DAILY
Qty: 3.5 G | Refills: 1 | Status: SHIPPED | OUTPATIENT
Start: 2022-04-11

## 2022-04-11 ASSESSMENT — PAIN SCALES - GENERAL: PAINLEVEL: NO PAIN (1)

## 2022-04-12 NOTE — NURSING NOTE
"Chief Complaint   Patient presents with     Eye Problem     Patient presented to the clinic with right eye swelling that he noticed about two days ago and he is unsure what happened. 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    Initial There were no vitals taken for this visit. Estimated body mass index is 22.32 kg/m  as calculated from the following:    Height as of 1/24/22: 1.803 m (5' 11\").    Weight as of 1/24/22: 72.6 kg (160 lb).     Visual Acuity Screening - [facility] Chart   Visualacuity OD (right eye): 10/ 20   Visual acuity OS (left eye): 10/ 20   Visual acuity OU (both eyes): 10/ 20   Corrective lenses worn: No     FOOD SECURITY SCREENING QUESTIONS:    The next two questions are to help us understand your food security.  If you are feeling you need any assistance in this area, we have resources available to support you today.    Hunger Vital Signs:  Within the past 12 months we worried whether our food would run out before we got money to buy more. Never  Within the past 12 months the food we bought just didn't last and we didn't have money to get more. Never      Medication Reconciliation: Complete      Kamilah Pace LPN  "

## 2022-04-12 NOTE — PROGRESS NOTES
"Nursing Notes:   Kamilah Pace LPN  4/11/2022  8:06 PM  Sign at exiting of workspace  Chief Complaint   Patient presents with     Eye Problem     Patient presented to the clinic with right eye swelling that he noticed about two days ago and he is unsure what happened. 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    Initial There were no vitals taken for this visit. Estimated body mass index is 22.32 kg/m  as calculated from the following:    Height as of 1/24/22: 1.803 m (5' 11\").    Weight as of 1/24/22: 72.6 kg (160 lb).     Visual Acuity Screening - [facility] Chart   Visualacuity OD (right eye): 10/ 20   Visual acuity OS (left eye): 10/ 20   Visual acuity OU (both eyes): 10/ 20   Corrective lenses worn: No     FOOD SECURITY SCREENING QUESTIONS:    The next two questions are to help us understand your food security.  If you are feeling you need any assistance in this area, we have resources available to support you today.    Hunger Vital Signs:  Within the past 12 months we worried whether our food would run out before we got money to buy more. Never  Within the past 12 months the food we bought just didn't last and we didn't have money to get more. Never      Medication Reconciliation: Complete      Kamilah Pace LPN  ASSESSMENT     1. Eye infection, right          PLAN:  Prescription for erythromycin ophthalmic ointment-1/4 inch in right eye  twice x 5 days  Gently wash eye and lids or use warm packs as discussed.    Patient education re: conjunctivitis, viral vs bacterial, treatment, precautions and prevention of eye injury and debris discussed.   Wear protective eye wear when working.  Mila Antoine MD  8:17 PM 4/11/2022         SUBJECTIVE:  Ankush Gamez presents  for evaluation of infection, irritation or foreign body in right eye. The eye has been irritated for 2-3 days and works with grinding metal and cuts wood. Wears protective eye covers. " "No drainage but more red and puffy lateral eye and upper lid tonight. NO photophobia or blurring of vision. No crusting or drainage. May have dry eyes as often red/irritated.    Not employed. Works for himself.        No Known Allergies  Patient Active Problem List    Diagnosis Date Noted     Abdominal pain, right lower quadrant 01/25/2022     Priority: Medium     Cigarette smoker 01/25/2022     Priority: Medium     C. difficile colitis 01/25/2022     Priority: Medium     Bilateral carpal tunnel syndrome 12/20/2019     Priority: Medium     Adjustment disorder with mixed anxiety and depressed mood 02/24/2008     Priority: Medium     Esophageal reflux 02/24/2008     Priority: Medium     Methamphetamine abuse in remission (H) 02/24/2008     Priority: Medium       OBJECTIVE:  /68 (BP Location: Left arm, Patient Position: Sitting, Cuff Size: Adult Large)   Pulse 72   Temp 99.1  F (37.3  C) (Tympanic)   Resp 14   Ht 1.803 m (5' 11\")   Wt 76.2 kg (168 lb)   SpO2 96%   BMI 23.43 kg/m    EYES: right eye blepharal and scleral injection laterally  - no crusting or discharge.  No periorbital edema but right upper lid slightly swollen and red laterally only. Small amount of debris in inner corner of right eye. NO embedded FB in the remainder of exam. Conjunctiva not involved.       "

## 2022-10-22 ENCOUNTER — OFFICE VISIT (OUTPATIENT)
Dept: FAMILY MEDICINE | Facility: OTHER | Age: 47
End: 2022-10-22
Attending: NURSE PRACTITIONER

## 2022-10-22 VITALS
BODY MASS INDEX: 22.9 KG/M2 | SYSTOLIC BLOOD PRESSURE: 132 MMHG | DIASTOLIC BLOOD PRESSURE: 70 MMHG | OXYGEN SATURATION: 95 % | HEART RATE: 68 BPM | HEIGHT: 71 IN | TEMPERATURE: 97.8 F | RESPIRATION RATE: 16 BRPM | WEIGHT: 163.6 LBS

## 2022-10-22 DIAGNOSIS — S61.319D LACERATION OF FINGER OF LEFT HAND WITHOUT FOREIGN BODY WITH DAMAGE TO NAIL, UNSPECIFIED FINGER, SUBSEQUENT ENCOUNTER: Primary | ICD-10-CM

## 2022-10-22 PROCEDURE — 99213 OFFICE O/P EST LOW 20 MIN: CPT | Performed by: FAMILY MEDICINE

## 2022-10-22 ASSESSMENT — PAIN SCALES - GENERAL: PAINLEVEL: NO PAIN (0)

## 2022-10-22 NOTE — PROGRESS NOTES
"    (H80.459H) Laceration of finger of left hand without foreign body with damage to nail, unspecified finger, subsequent encounter  (primary encounter diagnosis)  Comment:     Lacerations of left thumb, index, middle finger from table saw.  Multiple sutures removed.  Nail damage of middle finger is noted.  No apparent infection.    Plan:   In terms of a nail fragment down near the base of the nail, advised him to soak twice daily.  Keep it bandaged to avoid trauma.  If particularly bothersome consider orthopedic consultation as I suspect the fragment could be removed.      CHIEF COMPLAINT    Follow-up lacerations of fingers left hand.      HISTORY    Patient had a table saw accident 11 days ago.  He was sutured up in the [facility] emergency room.    He had irregular lacerations of the distal thumb, index, middle finger.      EXAM  /70   Pulse 68   Temp 97.8  F (36.6  C) (Tympanic)   Resp 16   Ht 1.803 m (5' 11\")   Wt 74.2 kg (163 lb 9.6 oz)   SpO2 95%   BMI 22.82 kg/m      There is no redness or swelling or purulent drainage to suggest infection.  There were multiple sutures in the distal portion of these 3 fingers.  I removed all of them uneventfully.  There is some damage at the base of the nail of the middle finger with a small protruding fragment at the base.  This is tender.    After suture removal these fingers were treated with Band-Aids.      "

## 2022-10-22 NOTE — NURSING NOTE
"Chief Complaint   Patient presents with     Suture Removal     Patient is here for suture removal of the left thumb, index finger and middle fingers. Patient had them placed in the [place] ED on 10/11/22.    Initial /70   Pulse 68   Temp 97.8  F (36.6  C) (Tympanic)   Resp 16   Ht 1.803 m (5' 11\")   Wt 74.2 kg (163 lb 9.6 oz)   SpO2 95%   BMI 22.82 kg/m   Estimated body mass index is 22.82 kg/m  as calculated from the following:    Height as of this encounter: 1.803 m (5' 11\").    Weight as of this encounter: 74.2 kg (163 lb 9.6 oz).  Medication Reconciliation: complete    Valerie Dinero LPN  "

## (undated) RX ORDER — SODIUM CHLORIDE 9 MG/ML
INJECTION, SOLUTION INTRAVENOUS
Status: DISPENSED
Start: 2022-01-25

## (undated) RX ORDER — CEFOXITIN 1 G/1
INJECTION, POWDER, FOR SOLUTION INTRAVENOUS
Status: DISPENSED
Start: 2022-01-25

## (undated) RX ORDER — MAGNESIUM HYDROXIDE/ALUMINUM HYDROXICE/SIMETHICONE 120; 1200; 1200 MG/30ML; MG/30ML; MG/30ML
SUSPENSION ORAL
Status: DISPENSED
Start: 2022-01-24

## (undated) RX ORDER — LIDOCAINE HYDROCHLORIDE 20 MG/ML
SOLUTION OROPHARYNGEAL
Status: DISPENSED
Start: 2022-01-24